# Patient Record
Sex: MALE | Race: WHITE | NOT HISPANIC OR LATINO | Employment: OTHER | URBAN - METROPOLITAN AREA
[De-identification: names, ages, dates, MRNs, and addresses within clinical notes are randomized per-mention and may not be internally consistent; named-entity substitution may affect disease eponyms.]

---

## 2017-01-25 ENCOUNTER — TELEPHONE (OUTPATIENT)
Dept: CARDIOLOGY | Facility: MEDICAL CENTER | Age: 72
End: 2017-01-25

## 2017-01-25 DIAGNOSIS — E78.5 HYPERLIPIDEMIA, UNSPECIFIED HYPERLIPIDEMIA TYPE: ICD-10-CM

## 2017-01-25 DIAGNOSIS — I10 ESSENTIAL HYPERTENSION: ICD-10-CM

## 2017-01-25 NOTE — TELEPHONE ENCOUNTER
----- Message from Karmen Crandall, Med Ass't sent at 1/25/2017  9:26 AM PST -----  Regarding: Lab Order Request   Contact: [phone]  Patient of [place] would like lab order sent to mailing address on file prior to appointment on 4/17/17.    Thank you

## 2017-04-06 LAB
ALBUMIN SERPL-MCNC: 4.5 G/DL (ref 3.5–4.8)
ALBUMIN/GLOB SERPL: 1.7 {RATIO} (ref 1.2–2.2)
ALP SERPL-CCNC: 68 IU/L (ref 39–117)
ALT SERPL-CCNC: 11 IU/L (ref 0–44)
AMBIG ABBREV CMP14 DFLT   977206: NORMAL
AMBIG ABBREV LP  977212: NORMAL
AST SERPL-CCNC: 13 IU/L (ref 0–40)
BILIRUB SERPL-MCNC: 0.7 MG/DL (ref 0–1.2)
BUN SERPL-MCNC: 21 MG/DL (ref 8–27)
BUN/CREAT SERPL: 24 (ref 10–24)
CALCIUM SERPL-MCNC: 9.7 MG/DL (ref 8.6–10.2)
CHLORIDE SERPL-SCNC: 100 MMOL/L (ref 96–106)
CHOLEST SERPL-MCNC: 208 MG/DL (ref 100–199)
CO2 SERPL-SCNC: 24 MMOL/L (ref 18–29)
COMMENT 011824: ABNORMAL
CREAT SERPL-MCNC: 0.89 MG/DL (ref 0.76–1.27)
GLOBULIN SER CALC-MCNC: 2.6 G/DL (ref 1.5–4.5)
GLUCOSE SERPL-MCNC: 92 MG/DL (ref 65–99)
HDLC SERPL-MCNC: 52 MG/DL
LDLC SERPL CALC-MCNC: 129 MG/DL (ref 0–99)
POTASSIUM SERPL-SCNC: 4.2 MMOL/L (ref 3.5–5.2)
PROT SERPL-MCNC: 7.1 G/DL (ref 6–8.5)
SODIUM SERPL-SCNC: 140 MMOL/L (ref 134–144)
TRIGL SERPL-MCNC: 135 MG/DL (ref 0–149)
VLDLC SERPL CALC-MCNC: 27 MG/DL (ref 5–40)

## 2017-04-17 ENCOUNTER — OFFICE VISIT (OUTPATIENT)
Dept: CARDIOLOGY | Facility: PHYSICIAN GROUP | Age: 72
End: 2017-04-17
Payer: MEDICARE

## 2017-04-17 VITALS
DIASTOLIC BLOOD PRESSURE: 76 MMHG | BODY MASS INDEX: 23.1 KG/M2 | HEIGHT: 71 IN | OXYGEN SATURATION: 93 % | WEIGHT: 165 LBS | HEART RATE: 76 BPM | SYSTOLIC BLOOD PRESSURE: 102 MMHG

## 2017-04-17 DIAGNOSIS — I49.3 PVC (PREMATURE VENTRICULAR CONTRACTION): ICD-10-CM

## 2017-04-17 DIAGNOSIS — E78.2 MIXED HYPERLIPIDEMIA: ICD-10-CM

## 2017-04-17 PROCEDURE — 4040F PNEUMOC VAC/ADMIN/RCVD: CPT | Mod: 8P | Performed by: INTERNAL MEDICINE

## 2017-04-17 PROCEDURE — 1036F TOBACCO NON-USER: CPT | Performed by: INTERNAL MEDICINE

## 2017-04-17 PROCEDURE — 3017F COLORECTAL CA SCREEN DOC REV: CPT | Mod: 8P | Performed by: INTERNAL MEDICINE

## 2017-04-17 PROCEDURE — 99213 OFFICE O/P EST LOW 20 MIN: CPT | Performed by: INTERNAL MEDICINE

## 2017-04-17 PROCEDURE — 1101F PT FALLS ASSESS-DOCD LE1/YR: CPT | Mod: 8P | Performed by: INTERNAL MEDICINE

## 2017-04-17 PROCEDURE — G8598 ASA/ANTIPLAT THER USED: HCPCS | Performed by: INTERNAL MEDICINE

## 2017-04-17 PROCEDURE — G8432 DEP SCR NOT DOC, RNG: HCPCS | Performed by: INTERNAL MEDICINE

## 2017-04-17 PROCEDURE — G8420 CALC BMI NORM PARAMETERS: HCPCS | Performed by: INTERNAL MEDICINE

## 2017-04-17 ASSESSMENT — ENCOUNTER SYMPTOMS
CARDIOVASCULAR NEGATIVE: 1
DIZZINESS: 1

## 2017-04-17 NOTE — PROGRESS NOTES
Subjective:   Avery Monroe is a 72 -year-old man with a history of dyslipidemia and erectile dysfunction as well as previously symptomatic PVCs.    He is doing very well, and has no cardiovascular complaints. He is mildly concerned about elevations in his diastolic blood pressure with 2 values of several just above 90 mmHg on the blood pressure log which she brings in to review with me today. Systolic blood pressures range between 90 and 120 mmHg.    Since the time of our last visit he had a hemicolectomy for a colon mass that turned out to be benign. Unfortunately, the initial re-anastomosis did not take, and he required a repeat side to side procedure. He had some diarrhea immediately following that related to which she had to adjust the formulation of his magnesium from magnesium oxide to citrate to ultimately magnesium taurine, which she is on now without any loose bowel movements.    Past Medical History   Diagnosis Date   • Hyperlipidemia    • Hypertension    • Mitral regurgitation    • PVC (premature ventricular contraction)    • Prostate cancer (CMS-HCC)    • Rheumatic fever      History reviewed. No pertinent past surgical history.  Family History   Problem Relation Age of Onset   • Heart Attack Mother    • Heart Attack Father      History   Smoking status   • Never Smoker    Smokeless tobacco   • Never Used     Allergies   Allergen Reactions   • Statins [Hmg-Coa-R Inhibitors]    • Sulfa Drugs      Outpatient Encounter Prescriptions as of 4/17/2017   Medication Sig Dispense Refill   • CREATINE PO Take  by mouth.     • magnesium gluconate (MAG-G) 500 MG tablet Take 500 mg by mouth every day.     • Calcium Carbonate-Vitamin D (CALCIUM PLUS VITAMIN D PO) Take  by mouth.     • B Complex Vitamins (B COMPLEX 1 PO) Take  by mouth every day.     • aspirin EC (ECOTRIN) 81 MG TBEC Take 81 mg by mouth every day.     • sildenafil citrate (VIAGRA) 100 MG tablet Take 100 mg by mouth as needed.       No  "facility-administered encounter medications on file as of 4/17/2017.     Review of Systems   Cardiovascular: Negative.    Neurological: Positive for dizziness (orthostasis after workouts).   All other systems reviewed and are negative.       Objective:   /76 mmHg  Pulse 76  Ht 1.803 m (5' 10.98\")  Wt 74.844 kg (165 lb)  BMI 23.02 kg/m2  SpO2 93%    Physical Exam   Constitutional: He is oriented to person, place, and time. He appears well-developed and well-nourished. No distress.   High-energy, pleasant elderly man in no distress   HENT:   Head: Normocephalic and atraumatic.   Eyes: Conjunctivae and EOM are normal. Pupils are equal, round, and reactive to light. No scleral icterus.   Neck: Neck supple. No JVD present. No tracheal deviation present.   Cardiovascular: Normal rate, regular rhythm, normal heart sounds and intact distal pulses.  Exam reveals no gallop and no friction rub.    No murmur heard.  Pulses:       Dorsalis pedis pulses are 2+ on the right side, and 2+ on the left side.   No carotid bruits   Pulmonary/Chest: Effort normal and breath sounds normal. No stridor. No respiratory distress. He has no wheezes. He has no rales.   Abdominal: Soft. Bowel sounds are normal. He exhibits no distension.   Musculoskeletal: He exhibits no edema.   Neurological: He is alert and oriented to person, place, and time.   Skin: Skin is warm and dry. No rash noted. He is not diaphoretic. No erythema. No pallor.   Psychiatric: He has a normal mood and affect. Judgment and thought content normal.   Vitals reviewed.       4/6/2015 10:42 3/21/2016 08:22 4/5/2017 08:30   Cholesterol,Tot 225 (H) 214 (H) 208 (H)   Triglycerides 112 81 135   HDL 56 67 52    (H) 131 (H) 129 (H)   VLDL Cholesterol Calc 22 16 27       Assessment:     1. PVC (premature ventricular contraction)     2. Mixed hyperlipidemia         Medical Decision Making:  Today's Assessment / Status / Plan:     Medical Decision Making:    He is " doing very well today from a cardiovascular perspective with much improved symptoms from the standpoint of his PVCs on magnesium. I reviewed again with him his lipid panel and discussed again a coronary CT scan, which she continues to defer. He was a bit concerned about diastolic blood pressures just barely above 90 mmHg. In our office today his blood pressure was 102/76 mmHg, and last time was 98/76 mmHg. I certainly would not recommend an antihypertensive for him presently. I reviewed again his previous exercise stress echocardiogram that showed no ischemic defect.    Rufus Conrad MD  Cardiologist, [facility] [place]

## 2017-04-17 NOTE — Clinical Note
Name:          Avery Monroe   YOB: 1945  Date:     4/17/2017      Damien Dinh M.D.  [address] [facility] [place] [address]     Rufus Conrad MD  [address] [place] [address]  Phone: [phone]  Back Line: [phone]  Fax: [phone]  E-mail: [email or web address]   Dear Dr. Dinh,    We had the pleasure of seeing your patient, Avery Monroe, in Cardiology Clinic at [facility] today.    As you know, he is a 72-year-old man with a history of dyslipidemia and erectile dysfunction as well as previously symptomatic PVCs.    He is doing very well today from a cardiovascular perspective with much improved symptoms from the standpoint of his PVCs on magnesium. I reviewed again with him his lipid panel and discussed again a coronary CT scan, which she continues to defer. He was a bit concerned about diastolic blood pressures just barely above 90 mmHg. In our office today his blood pressure was 102/76 mmHg, and last time was 98/76 mmHg. I certainly would not recommend an antihypertensive for him presently. I reviewed again his previous exercise stress echocardiogram that showed no ischemic defect.    We will have the patient follow-up in one year, sooner if needed.    Thank you for the referral and please do not hesitate to contact me at any time. My contact information is listed above.    This note was dictated using Dragon speech recognition software.     A full note including my physical examination and a full list of rectified medications is available in our medical record, and can be faxed as well.    Rufus Conrad MD  Cardiologist  [facility]

## 2017-04-17 NOTE — MR AVS SNAPSHOT
"        Avery Monroe   2017 3:00 PM   Office Visit   MRN: [id number]    Department:  [facility]   Dept Phone:  [phone]    Description:  Male : 1945   Provider:  Rufus Conrad M.D.           Reason for Visit     Follow-Up           Allergies as of 2017     Allergen Noted Reactions    Statins [Hmg-Coa-R Inhibitors] 2012       Sulfa Drugs 2012         Vital Signs     Blood Pressure Pulse Height Weight Body Mass Index Oxygen Saturation    102/76 mmHg 76 1.803 m (5' 10.98\") 74.844 kg (165 lb) 23.02 kg/m2 93%    Smoking Status                   Never Smoker            Basic Information     Date Of Birth Sex Race Ethnicity Preferred Language    1945 Male White Non- English      Problem List              ICD-10-CM Priority Class Noted - Resolved    Hyperlipidemia E78.5 High  Unknown - Present    Hypertension I10 High  Unknown - Present    PVC (premature ventricular contraction) I49.3 High  Unknown - Present    Carotid stenosis I65.29 High  2012 - Present      Health Maintenance        Date Due Completion Dates    IMM DTaP/Tdap/Td Vaccine (1 - Tdap) 3/7/1964 ---    COLONOSCOPY 3/7/1995 ---    IMM ZOSTER VACCINE 3/7/2005 ---    IMM PNEUMOCOCCAL 65+ (ADULT) LOW/MEDIUM RISK SERIES (1 of 2 - PCV13) 3/7/2010 ---            Current Immunizations     No immunizations on file.      Below and/or attached are the medications your provider expects you to take. Review all of your home medications and newly ordered medications with your provider and/or pharmacist. Follow medication instructions as directed by your provider and/or pharmacist. Please keep your medication list with you and share with your provider. Update the information when medications are discontinued, doses are changed, or new medications (including over-the-counter products) are added; and carry medication information at all times in the event of emergency situations     Allergies:  STATINS - (reactions " not documented)     SULFA DRUGS - (reactions not documented)               Medications  Valid as of: April 17, 2017 -  3:11 PM    Generic Name Brand Name Tablet Size Instructions for use    Aspirin (Tablet Delayed Response) ECOTRIN 81 MG Take 81 mg by mouth every day.        B Complex Vitamins   Take  by mouth every day.        Calcium Carbonate-Vitamin D   Take  by mouth.        Creatine   Take  by mouth.        Magnesium Gluconate (Tab) MAG-G 500 MG Take 500 mg by mouth every day.        Sildenafil Citrate (Tab) VIAGRA 100 MG Take 100 mg by mouth as needed.        .                 Medicines prescribed today were sent to:     [facility] [facility] [place] [place] - [address]    [address] [place] [address]    Phone: [phone] Fax: [phone]    Open 24 Hours?: No      Medication refill instructions:       If your prescription bottle indicates you have medication refills left, it is not necessary to call your provider’s office. Please contact your pharmacy and they will refill your medication.    If your prescription bottle indicates you do not have any refills left, you may request refills at any time through one of the following ways: The online [employer] system (except Urgent Care), by calling your provider’s office, or by asking your pharmacy to contact your provider’s office with a refill request. Medication refills are processed only during regular business hours and may not be available until the next business day. Your provider may request additional information or to have a follow-up visit with you prior to refilling your medication.   *Please Note: Medication refills are assigned a new Rx number when refilled electronically. Your pharmacy may indicate that no refills were authorized even though a new prescription for the same medication is available at the pharmacy. Please request the medicine by name with the pharmacy before contacting your provider for a refill.               [employer] Access Code: Activation code not generated  Current [employer] Status: Active

## 2017-04-17 NOTE — PROGRESS NOTES
Name:          Avery Monroe   YOB: 1945  Date:     4/17/2017      Damien Dinh M.D.  [address] [facility] [place] [address]     Rufus Conrad MD  [address] [place] [address]  Phone: [phone]  Back Line: [phone]  Fax: [phone]  E-mail: [email or web address]   Dear Dr. Dinh,    We had the pleasure of seeing your patient, Avery Monroe, in Cardiology Clinic at [facility] today.    As you know, he is a 72-year-old man with a history of dyslipidemia and erectile dysfunction as well as previously symptomatic PVCs.    He is doing very well today from a cardiovascular perspective with much improved symptoms from the standpoint of his PVCs on magnesium. I reviewed again with him his lipid panel and discussed again a coronary CT scan, which she continues to defer. He was a bit concerned about diastolic blood pressures just barely above 90 mmHg. In our office today his blood pressure was 102/76 mmHg, and last time was 98/76 mmHg. I certainly would not recommend an antihypertensive for him presently. I reviewed again his previous exercise stress echocardiogram that showed no ischemic defect.    We will have the patient follow-up in one year, sooner if needed.    Thank you for the referral and please do not hesitate to contact me at any time. My contact information is listed above.    This note was dictated using Dragon speech recognition software.     A full note including my physical examination and a full list of rectified medications is available in our medical record, and can be faxed as well.    Rufus Conrad MD  Cardiologist  [facility]

## 2018-04-25 ENCOUNTER — TELEPHONE (OUTPATIENT)
Dept: CARDIOLOGY | Facility: MEDICAL CENTER | Age: 73
End: 2018-04-25

## 2018-04-25 DIAGNOSIS — E78.5 HYPERLIPIDEMIA, UNSPECIFIED HYPERLIPIDEMIA TYPE: ICD-10-CM

## 2018-04-25 DIAGNOSIS — I10 ESSENTIAL HYPERTENSION: ICD-10-CM

## 2018-04-25 NOTE — TELEPHONE ENCOUNTER
----- Message from Cinthya Huitron, Med Ass't sent at 4/25/2018 10:20 AM PDT -----  Regarding: lab order  Please put in an order for BMP ans Lipid. Has an upcoming appointment.    Thank you

## 2018-04-27 ENCOUNTER — HOSPITAL ENCOUNTER (OUTPATIENT)
Dept: LAB | Facility: MEDICAL CENTER | Age: 73
End: 2018-04-27
Attending: UROLOGY
Payer: MEDICARE

## 2018-04-27 LAB — PSA SERPL-MCNC: <0.01 NG/ML (ref 0–4)

## 2018-04-27 PROCEDURE — 84153 ASSAY OF PSA TOTAL: CPT

## 2018-04-27 PROCEDURE — 36415 COLL VENOUS BLD VENIPUNCTURE: CPT

## 2018-05-02 ENCOUNTER — OFFICE VISIT (OUTPATIENT)
Dept: CARDIOLOGY | Facility: PHYSICIAN GROUP | Age: 73
End: 2018-05-02
Payer: MEDICARE

## 2018-05-02 VITALS
HEIGHT: 71 IN | OXYGEN SATURATION: 95 % | WEIGHT: 165 LBS | HEART RATE: 68 BPM | SYSTOLIC BLOOD PRESSURE: 112 MMHG | DIASTOLIC BLOOD PRESSURE: 80 MMHG | BODY MASS INDEX: 23.1 KG/M2

## 2018-05-02 DIAGNOSIS — I49.3 PVC (PREMATURE VENTRICULAR CONTRACTION): ICD-10-CM

## 2018-05-02 DIAGNOSIS — E78.5 DYSLIPIDEMIA: ICD-10-CM

## 2018-05-02 DIAGNOSIS — I10 ESSENTIAL HYPERTENSION: ICD-10-CM

## 2018-05-02 PROCEDURE — 99213 OFFICE O/P EST LOW 20 MIN: CPT | Performed by: INTERNAL MEDICINE

## 2018-05-02 ASSESSMENT — ENCOUNTER SYMPTOMS
CARDIOVASCULAR NEGATIVE: 1
DIZZINESS: 1

## 2018-05-02 NOTE — LETTER
Name:          Avery Monroe   YOB: 1945  Date:     05/02/2018      Damien Dinh M.D.  [address] [facility] [place] [address]     Rufus Conrad MD  [address] [place] [address]  Phone: [phone]  Back Line: [phone]  Fax: [phone]  E-mail: [email or web address]   Dear Dr. Dinh,    We had the pleasure of seeing your patient, Avery Monroe, in Cardiology Clinic at [facility] V[facility] today.    As you know, he is a 73-year-old man with a history of dyslipidemia and erectile dysfunction as well as previously symptomatic PVCs.    He is doing well today, and has no cardiovascular complaints. I reviewed his recent lipid panel with an LDL of 129 mg/dL. Given his rigorous physical activity, and paucity of other risk factors I am a bit reticent to initiate a statin. As an alternative, I have ordered a CT coronary calcium scan. Otherwise, his blood pressure is well controlled with diet and exercise. I have made no changes to his medical regimen.    Return in about 1 year (around 5/2/2019).    Thank you for the referral and please do not hesitate to contact me at any time. My contact information is listed above.    This note was dictated using Dragon speech recognition software.     A full note including my physical examination and a full list of rectified medications is available in our medical record, and can be faxed as well.    Rufus Conrad MD  Cardiologist  [facility]

## 2018-05-02 NOTE — PROGRESS NOTES
Subjective:   Avery Monroe is a 72 -year-old man with a history of dyslipidemia and erectile dysfunction as well as previously symptomatic PVCs.    He continues to do quite well with no complaints of palpitations consistent with his PVCs. He exercises very regularly, and brings in a log of his blood pressures which often times are in the 100s-110s mmHg systolic. He is concerned about his upcoming summer competition season including runs and swims, which can be fairly rigorous. In the past, he had routine stress testing to rule out obstructive coronary disease most recently with an exercise stress echocardiogram in 2014. All of those tests were negative for ischemia.    He does tell me about some episodes of tremulousness that he resolved with substituting his magnesium supplementation and going back up to 500 mg of elemental magnesium.    Past Medical History:   Diagnosis Date   • Carotid stenosis    • Hyperlipidemia    • Hypertension    • Mitral regurgitation    • Prostate cancer (HCC)    • PVC (premature ventricular contraction)    • Rheumatic fever      History reviewed. No pertinent surgical history.  Family History   Problem Relation Age of Onset   • Heart Attack Mother    • Heart Attack Father      History   Smoking Status   • Never Smoker   Smokeless Tobacco   • Never Used     Allergies   Allergen Reactions   • Statins [Hmg-Coa-R Inhibitors]    • Sulfa Drugs      Outpatient Encounter Prescriptions as of 5/2/2018   Medication Sig Dispense Refill   • CREATINE PO Take  by mouth.     • magnesium gluconate (MAG-G) 500 MG tablet Take 500 mg by mouth every day.     • Calcium Carbonate-Vitamin D (CALCIUM PLUS VITAMIN D PO) Take  by mouth.     • B Complex Vitamins (B COMPLEX 1 PO) Take  by mouth every day.     • aspirin EC (ECOTRIN) 81 MG TBEC Take 81 mg by mouth every day.     • sildenafil citrate (VIAGRA) 100 MG tablet Take 100 mg by mouth as needed.       No facility-administered encounter medications on  "file as of 5/2/2018.      Review of Systems   Cardiovascular: Negative.    Neurological: Positive for dizziness (orthostasis after workouts).   All other systems reviewed and are negative.       Objective:   /80   Pulse 68   Ht 1.803 m (5' 11\")   Wt 74.8 kg (165 lb)   SpO2 95%   BMI 23.01 kg/m²     Physical Exam   Constitutional: He is oriented to person, place, and time. He appears well-developed and well-nourished. No distress.   High-energy, pleasant elderly man in no distress   HENT:   Head: Normocephalic and atraumatic.   Eyes: Conjunctivae and EOM are normal. Pupils are equal, round, and reactive to light. No scleral icterus.   Neck: Neck supple. No JVD present. No tracheal deviation present.   Cardiovascular: Normal rate, regular rhythm, normal heart sounds and intact distal pulses.  Exam reveals no gallop and no friction rub.    No murmur heard.  Pulses:       Dorsalis pedis pulses are 2+ on the right side, and 2+ on the left side.   No carotid bruits   Pulmonary/Chest: Effort normal and breath sounds normal. No stridor. No respiratory distress. He has no wheezes. He has no rales.   Abdominal: Soft. Bowel sounds are normal. He exhibits no distension.   Musculoskeletal: He exhibits no edema.   Neurological: He is alert and oriented to person, place, and time.   Skin: Skin is warm and dry. No rash noted. He is not diaphoretic. No erythema. No pallor.   Psychiatric: He has a normal mood and affect. Judgment and thought content normal.   Vitals reviewed.    No results found for: WBC, RBC, HEMOGLOBIN, HEMATOCRIT, MCV, MCH, MCHC, MPV     Lab Results   Component Value Date/Time    SODIUM 140 04/05/2017 08:30 AM    SODIUM 141 04/05/2013 06:40 AM    SODIUM 141 04/05/2013 06:40 AM    POTASSIUM 4.2 04/05/2017 08:30 AM    POTASSIUM 4.1 04/05/2013 06:40 AM    POTASSIUM 4.1 04/05/2013 06:40 AM    CHLORIDE 100 04/05/2017 08:30 AM    CHLORIDE 105 04/05/2013 06:40 AM    CHLORIDE 105 04/05/2013 06:40 AM    CO2 24 " 04/05/2017 08:30 AM    CO2 24 04/05/2013 06:40 AM    CO2 24 04/05/2013 06:40 AM    GLUCOSE 92 04/05/2017 08:30 AM    GLUCOSE 94 04/05/2013 06:40 AM    GLUCOSE 94 04/05/2013 06:40 AM    BUN 21 04/05/2017 08:30 AM    BUN 24 04/05/2013 06:40 AM    BUN 24 04/05/2013 06:40 AM    CREATININE 0.89 04/05/2017 08:30 AM    CREATININE 0.85 04/05/2013 06:40 AM    CREATININE 0.85 04/05/2013 06:40 AM    BUNCREATRAT 24 04/05/2017 08:30 AM    BUNCREATRAT 28 (H) 04/05/2013 06:40 AM    BUNCREATRAT 28 (H) 04/05/2013 06:40 AM        Lab Results   Component Value Date/Time    ASTSGOT 13 04/05/2017 08:30 AM    ASTSGOT 20 04/05/2013 06:40 AM    ASTSGOT 20 04/05/2013 06:40 AM    ALTSGPT 11 04/05/2017 08:30 AM    ALTSGPT 16 04/05/2013 06:40 AM    ALTSGPT 16 04/05/2013 06:40 AM        Lab Results   Component Value Date/Time    CHOLSTRLTOT 208 (H) 04/05/2017 08:30 AM    CHOLSTRLTOT 179 04/05/2013 06:40 AM    CHOLSTRLTOT 179 04/05/2013 06:40 AM     (H) 04/05/2017 08:30 AM     (H) 04/05/2013 06:40 AM     (H) 04/05/2013 06:40 AM    HDL 52 04/05/2017 08:30 AM    HDL 54 04/05/2013 06:40 AM    HDL 54 04/05/2013 06:40 AM    TRIGLYCERIDE 135 04/05/2017 08:30 AM    TRIGLYCERIDE 92 04/05/2013 06:40 AM    TRIGLYCERIDE 92 04/05/2013 06:40 AM         No results found for this or any previous visit.    Exercise stress echocardiogram, 5/7/2014: He achieved 7 metabolic equivalents and 99% of age-predicted maximum heart rate with no ischemic ECG nor echocardiographic findings (negative for ischemia)    Assessment:     1. Dyslipidemia  CT-CARDIAC SCORING   2. Essential hypertension  EKG   3. PVC (premature ventricular contraction)         Medical Decision Making:  Today's Assessment / Status / Plan:     He is doing well today, and has no cardiovascular complaints. I reviewed his recent lipid panel with an LDL of 129 mg/dL. Given his rigorous physical activity, and paucity of other risk factors I am a bit reticent to initiate a statin. As an  alternative, I have ordered a CT coronary calcium scan. Otherwise, his blood pressure is well controlled with diet and exercise. I have made no changes to his medical regimen.    Rufus Conrad MD  Cardiologist, [facility] [place]     Return in about 1 year (around 5/2/2019).      Physical Exam   Constitutional: He is oriented to person, place, and time. He appears well-developed and well-nourished. No distress.   High-energy, pleasant elderly man in no distress   HENT:   Head: Normocephalic and atraumatic.   Eyes: Conjunctivae and EOM are normal. Pupils are equal, round, and reactive to light. No scleral icterus.   Neck: Neck supple. No JVD present. No tracheal deviation present.   Cardiovascular: Normal rate, regular rhythm, normal heart sounds and intact distal pulses.  Exam reveals no gallop and no friction rub.    No murmur heard.  Pulses:       Dorsalis pedis pulses are 2+ on the right side, and 2+ on the left side.   No carotid bruits   Pulmonary/Chest: Effort normal and breath sounds normal. No stridor. No respiratory distress. He has no wheezes. He has no rales.   Abdominal: Soft. Bowel sounds are normal. He exhibits no distension.   Musculoskeletal: He exhibits no edema.   Neurological: He is alert and oriented to person, place, and time.   Skin: Skin is warm and dry. No rash noted. He is not diaphoretic. No erythema. No pallor.   Psychiatric: He has a normal mood and affect. Judgment and thought content normal.   Vitals reviewed.

## 2018-05-03 ENCOUNTER — HOSPITAL ENCOUNTER (OUTPATIENT)
Dept: LAB | Facility: MEDICAL CENTER | Age: 73
End: 2018-05-03
Attending: INTERNAL MEDICINE
Payer: MEDICARE

## 2018-05-03 DIAGNOSIS — E78.5 HYPERLIPIDEMIA, UNSPECIFIED HYPERLIPIDEMIA TYPE: ICD-10-CM

## 2018-05-03 DIAGNOSIS — I10 ESSENTIAL HYPERTENSION: ICD-10-CM

## 2018-05-03 LAB
ALBUMIN SERPL BCP-MCNC: 3.9 G/DL (ref 3.2–4.9)
ALBUMIN/GLOB SERPL: 1.4 G/DL
ALP SERPL-CCNC: 46 U/L (ref 30–99)
ALT SERPL-CCNC: 12 U/L (ref 2–50)
ANION GAP SERPL CALC-SCNC: 9 MMOL/L (ref 0–11.9)
AST SERPL-CCNC: 14 U/L (ref 12–45)
BILIRUB SERPL-MCNC: 0.5 MG/DL (ref 0.1–1.5)
BUN SERPL-MCNC: 17 MG/DL (ref 8–22)
CALCIUM SERPL-MCNC: 9.3 MG/DL (ref 8.5–10.5)
CHLORIDE SERPL-SCNC: 104 MMOL/L (ref 96–112)
CHOLEST SERPL-MCNC: 187 MG/DL (ref 100–199)
CO2 SERPL-SCNC: 25 MMOL/L (ref 20–33)
CREAT SERPL-MCNC: 0.93 MG/DL (ref 0.5–1.4)
GLOBULIN SER CALC-MCNC: 2.8 G/DL (ref 1.9–3.5)
GLUCOSE SERPL-MCNC: 78 MG/DL (ref 65–99)
HDLC SERPL-MCNC: 53 MG/DL
LDLC SERPL CALC-MCNC: 114 MG/DL
POTASSIUM SERPL-SCNC: 4.2 MMOL/L (ref 3.6–5.5)
PROT SERPL-MCNC: 6.7 G/DL (ref 6–8.2)
SODIUM SERPL-SCNC: 138 MMOL/L (ref 135–145)
TRIGL SERPL-MCNC: 100 MG/DL (ref 0–149)

## 2018-05-03 PROCEDURE — 80053 COMPREHEN METABOLIC PANEL: CPT

## 2018-05-03 PROCEDURE — 36415 COLL VENOUS BLD VENIPUNCTURE: CPT

## 2018-05-03 PROCEDURE — 80061 LIPID PANEL: CPT

## 2018-05-07 ENCOUNTER — HOSPITAL ENCOUNTER (OUTPATIENT)
Dept: RADIOLOGY | Facility: MEDICAL CENTER | Age: 73
End: 2018-05-07
Attending: INTERNAL MEDICINE
Payer: COMMERCIAL

## 2018-05-07 DIAGNOSIS — E78.5 DYSLIPIDEMIA: ICD-10-CM

## 2018-05-07 PROCEDURE — 4410556 CT-CARDIAC SCORING

## 2018-05-08 ENCOUNTER — TELEPHONE (OUTPATIENT)
Dept: CARDIOLOGY | Facility: MEDICAL CENTER | Age: 73
End: 2018-05-08

## 2018-05-08 NOTE — TELEPHONE ENCOUNTER
----- Message from Rufus Conrad M.D. sent at 5/8/2018  8:10 AM PDT -----  Normal renal function, and electrolytes. Cholesterol is within the range of previous variation, and I will await the formal read on the CT coronary calcium scan to define my treatment target.    JUANITO HOFFMAN

## 2018-05-16 ENCOUNTER — TELEPHONE (OUTPATIENT)
Dept: CARDIOLOGY | Facility: MEDICAL CENTER | Age: 73
End: 2018-05-16

## 2018-05-16 DIAGNOSIS — E78.2 MIXED HYPERLIPIDEMIA: ICD-10-CM

## 2018-05-16 RX ORDER — ATORVASTATIN CALCIUM 10 MG/1
10 TABLET, FILM COATED ORAL EVERY EVENING
Qty: 30 TAB | Refills: 3 | Status: SHIPPED | OUTPATIENT
Start: 2018-05-16 | End: 2018-08-14 | Stop reason: SDUPTHER

## 2018-05-16 NOTE — TELEPHONE ENCOUNTER
Pt called back, discussed CT Cardiac scoring and Dr Conrad recommendations, pt agreed and verbalizes understanding, he requested for a 30 day supply of Atorvastatin to be sent to [facility] for now, he would like to monitor it first as he had a reaction from Simvastatin before, instructed pt to call our office if develop any problems after taking new meds, pt appreciative and verbalizes understanding     New Rx for Atorvastatin 10mg PO nightly sent to [facility]

## 2018-05-16 NOTE — TELEPHONE ENCOUNTER
----- Message from Rufus Conrad M.D. sent at 5/15/2018  9:22 PM PDT -----  Moderate but not severe coronary calcifications are seen.  On the basis of this I would recommend atorvastatin 10 mg p.o. nightly to target LDL less than 100 mg/dL.    IBA MD    ==========================================================    Attempted to call pt, no answer, left vm to call back

## 2018-08-14 ENCOUNTER — TELEPHONE (OUTPATIENT)
Dept: CARDIOLOGY | Facility: MEDICAL CENTER | Age: 73
End: 2018-08-14

## 2018-08-14 DIAGNOSIS — E78.2 MIXED HYPERLIPIDEMIA: ICD-10-CM

## 2018-08-14 RX ORDER — ATORVASTATIN CALCIUM 10 MG/1
10 TABLET, FILM COATED ORAL EVERY EVENING
Qty: 90 TAB | Refills: 3 | Status: SHIPPED | OUTPATIENT
Start: 2018-08-14 | End: 2019-09-23 | Stop reason: SDUPTHER

## 2018-08-14 NOTE — TELEPHONE ENCOUNTER
Avery Conrad M.D. 47 minutes ago (1:05 PM)         I just refilled my Atorvastatin 10mg for the last allowed refill for 30 days.   I am able to tolerate the drug just fine up to now.   I need further refills.  Do I need an appointment?  Do I need to do a lab?   FWIW   It is [facility] here in [place].  Rx#[id number]   Thank You,   Avery Monroe         ================================================================================    Refill Rx for Atorvastatin sent to [facility] per pt request.     Pt notified through [employer]

## 2019-04-08 ENCOUNTER — TELEPHONE (OUTPATIENT)
Dept: CARDIOLOGY | Facility: MEDICAL CENTER | Age: 74
End: 2019-04-08

## 2019-04-08 DIAGNOSIS — I10 ESSENTIAL HYPERTENSION: ICD-10-CM

## 2019-04-08 DIAGNOSIS — E78.2 MIXED HYPERLIPIDEMIA: ICD-10-CM

## 2019-04-08 NOTE — TELEPHONE ENCOUNTER
Avery Conrad M.D. 3 days ago         Avery Monroe   1945   I have an appointment with Dr Conrad on May 15, 2019 in [place].   In the past, the office has mailed me requisitions for lab.   Usually a complete metabolic panel and lipids.   This is my first year on Lipitor, do I need further tests for liver, muscle, etc.?   [address] [facility], [place]  [address]   Thanks        CMP and Lipid profile ordered, lab slip mailed to pt    Pt notified through [employer]

## 2019-05-08 LAB
ALBUMIN SERPL-MCNC: 4.5 G/DL (ref 3.5–4.8)
ALBUMIN/GLOB SERPL: 1.8 {RATIO} (ref 1.2–2.2)
ALP SERPL-CCNC: 68 IU/L (ref 39–117)
ALT SERPL-CCNC: 17 IU/L (ref 0–44)
AST SERPL-CCNC: 22 IU/L (ref 0–40)
BILIRUB SERPL-MCNC: 1 MG/DL (ref 0–1.2)
BUN SERPL-MCNC: 15 MG/DL (ref 8–27)
BUN/CREAT SERPL: 16 (ref 10–24)
CALCIUM SERPL-MCNC: 9.5 MG/DL (ref 8.6–10.2)
CHLORIDE SERPL-SCNC: 104 MMOL/L (ref 96–106)
CHOLEST SERPL-MCNC: 160 MG/DL (ref 100–199)
CO2 SERPL-SCNC: 22 MMOL/L (ref 20–29)
CREAT SERPL-MCNC: 0.92 MG/DL (ref 0.76–1.27)
GLOBULIN SER CALC-MCNC: 2.5 G/DL (ref 1.5–4.5)
GLUCOSE SERPL-MCNC: 98 MG/DL (ref 65–99)
HDLC SERPL-MCNC: 58 MG/DL
LABORATORY COMMENT REPORT: NORMAL
LDLC SERPL CALC-MCNC: 86 MG/DL (ref 0–99)
POTASSIUM SERPL-SCNC: 4.3 MMOL/L (ref 3.5–5.2)
PROT SERPL-MCNC: 7 G/DL (ref 6–8.5)
SODIUM SERPL-SCNC: 142 MMOL/L (ref 134–144)
TRIGL SERPL-MCNC: 78 MG/DL (ref 0–149)
VLDLC SERPL CALC-MCNC: 16 MG/DL (ref 5–40)

## 2019-05-15 ENCOUNTER — OFFICE VISIT (OUTPATIENT)
Dept: CARDIOLOGY | Facility: PHYSICIAN GROUP | Age: 74
End: 2019-05-15
Payer: MEDICARE

## 2019-05-15 VITALS
WEIGHT: 159 LBS | OXYGEN SATURATION: 94 % | HEIGHT: 71 IN | HEART RATE: 84 BPM | DIASTOLIC BLOOD PRESSURE: 80 MMHG | BODY MASS INDEX: 22.26 KG/M2 | SYSTOLIC BLOOD PRESSURE: 118 MMHG

## 2019-05-15 DIAGNOSIS — R93.1 AGATSTON CAC SCORE 200-399: Primary | ICD-10-CM

## 2019-05-15 DIAGNOSIS — I10 ESSENTIAL HYPERTENSION: ICD-10-CM

## 2019-05-15 DIAGNOSIS — E78.5 DYSLIPIDEMIA: ICD-10-CM

## 2019-05-15 DIAGNOSIS — I65.29 STENOSIS OF CAROTID ARTERY, UNSPECIFIED LATERALITY: ICD-10-CM

## 2019-05-15 PROCEDURE — 99214 OFFICE O/P EST MOD 30 MIN: CPT | Performed by: INTERNAL MEDICINE

## 2019-05-15 RX ORDER — HYDROCODONE BITARTRATE AND ACETAMINOPHEN 5; 325 MG/1; MG/1
TABLET ORAL
Refills: 0 | COMMUNITY
Start: 2019-04-23 | End: 2019-05-15

## 2019-05-15 RX ORDER — CHLORHEXIDINE GLUCONATE ORAL RINSE 1.2 MG/ML
SOLUTION DENTAL
Refills: 0 | COMMUNITY
Start: 2019-04-23 | End: 2019-05-15

## 2019-05-15 RX ORDER — AMOXICILLIN 500 MG/1
CAPSULE ORAL
Refills: 0 | COMMUNITY
Start: 2019-04-23 | End: 2019-05-15

## 2019-05-15 ASSESSMENT — ENCOUNTER SYMPTOMS
DIZZINESS: 1
CARDIOVASCULAR NEGATIVE: 1

## 2019-05-15 NOTE — LETTER
Name:          Avery Monroe   YOB: 1945  Date:     05/15/2019      Damien Dinh M.D.  [address] [facility] [place] [address]     Rufus Conrad MD  [address] [place] [id number]  Phone: [phone]  Back Line: [phone]  Fax: [phone]  E-mail: [email or web address]   Dear Dr. Dinh,    We had the pleasure of seeing your patient, Avery Monroe, in Cardiology Clinic at [facility] today.    As you know, he is a 74-year-old man with a history of dyslipidemia, moderately calcified coronary arteries with a calcium score of 214 on 5/2018, and erectile dysfunction as well as previously symptomatic PVCs.    He is doing wonderfully with much improved control of his cholesterol with the addition of atorvastatin on top of a very active lifestyle and a generally heart healthy diet.  I emphasized the importance of the same given his calcium score of 214 this time last year.  Outside of that, I made no changes to his cardiovascular regimen nor ordered additional testing.    Return in about 1 year (around 5/15/2020).    Thank you for the referral and please do not hesitate to contact me at any time. My contact information is listed above.    This note was dictated using Dragon speech recognition software.     A full note including my physical examination and a full list of rectified medications is available in our medical record, and can be faxed as well.    Rufus Conrad MD  Cardiologist  [facility]

## 2019-09-23 DIAGNOSIS — E78.2 MIXED HYPERLIPIDEMIA: ICD-10-CM

## 2019-09-24 RX ORDER — ATORVASTATIN CALCIUM 10 MG/1
TABLET, FILM COATED ORAL
Qty: 90 TAB | Refills: 2 | Status: SHIPPED | OUTPATIENT
Start: 2019-09-24 | End: 2020-06-23

## 2019-11-06 ENCOUNTER — OFFICE VISIT (OUTPATIENT)
Dept: CARDIOLOGY | Facility: PHYSICIAN GROUP | Age: 74
End: 2019-11-06
Payer: MEDICARE

## 2019-11-06 VITALS
HEART RATE: 66 BPM | OXYGEN SATURATION: 97 % | WEIGHT: 156 LBS | DIASTOLIC BLOOD PRESSURE: 80 MMHG | SYSTOLIC BLOOD PRESSURE: 120 MMHG | HEIGHT: 70 IN | BODY MASS INDEX: 22.33 KG/M2

## 2019-11-06 DIAGNOSIS — I25.10 CORONARY ARTERY CALCIFICATION SEEN ON CT SCAN: ICD-10-CM

## 2019-11-06 DIAGNOSIS — R93.1 AGATSTON CAC SCORE 200-399: ICD-10-CM

## 2019-11-06 DIAGNOSIS — E78.00 PURE HYPERCHOLESTEROLEMIA: ICD-10-CM

## 2019-11-06 DIAGNOSIS — I10 ESSENTIAL HYPERTENSION: ICD-10-CM

## 2019-11-06 DIAGNOSIS — I65.23 CAROTID ARTERY PLAQUE, BILATERAL: ICD-10-CM

## 2019-11-06 DIAGNOSIS — I49.3 PVC (PREMATURE VENTRICULAR CONTRACTION): ICD-10-CM

## 2019-11-06 PROCEDURE — 99214 OFFICE O/P EST MOD 30 MIN: CPT | Performed by: INTERNAL MEDICINE

## 2019-11-06 NOTE — PROGRESS NOTES
"Subjective:   Chief Complaint:   Chief Complaint   Patient presents with   • Hyperlipidemia       Avery Monroe is a 74 y.o. male who returns today for calcified coronary artery disease, calcium score 214 in May 2018, symptomatic PVCs and dyslipidemia.    He recalls mild HTN and PVCs and HLP.  PVCs found incidentally, went away with exercise, started Mg and they went away.    Underwent calcium scoring , predominantly LAD, left main was clear.  Previous stress echo in  which was normal.    Has pure hyperlipidemia, on Lipitor 10, LDL 86, had been 129.  Severe foot cramping on simvastatin.    Father  of MI and CVA after kidney failure, in his 90s.  Mother with HTN, stroke, had valve surgery  Former smoker, age 12 to 21. Has AAA screening.  No history of diabetes.  No recent hypertension, no meds.  No history of autoimmune disease such as lupus or rheumatoid arthritis.  No chronic kidney disease.    Prior hemicolectomy for polyp that turned out to be benign.    He is not limited by chest pain, pressure or tightness with activity.   No significant dyspnea on exertion, orthopnea or lower extremity swelling.   No significant palpitations, dizziness, or presyncope/syncope.   Mild orthostasis after exercise, brief, passes quickly.  No symptoms of leg claudication.   No stroke/TIA like symptoms.    He exercising vigorously.  He is an open water swimmer and runner.  HR at 125 bpm for 30 min, then intervals,     Lives in [place].    DATA REVIEWED by me:  ECG 2018  Sinus, rate 57    Exercise stress echocardiogram, 2014: He achieved 7 metabolic equivalents and 99% of age-predicted maximum heart rate with no ischemic ECG nor echocardiographic findings (negative for ischemia)     Coronary calcium scan, 2018:  \"Coronary calcification:  LMA - 0.0  LCX - 33.2  LAD - 164.5  RCA - 16.6  Calcium score:  214.4\"    Carotid ultrasound 2012 mild plaque, no stenosis    Most recent labs:     2019 sodium 142, " potassium 4.3, creatinine 0.92, LFTs normal, total cholesterol 160, triglycerides 78, HDL 58, LDL 86    Past Medical History:   Diagnosis Date   • Carotid stenosis    • Hyperlipidemia    • Hypertension    • Mitral regurgitation    • Prostate cancer (HCC)    • PVC (premature ventricular contraction)    • Rheumatic fever      History reviewed. No pertinent surgical history.  Family History   Problem Relation Age of Onset   • Heart Disease Mother         Valve replacement, late 80s   • Stroke Mother    • Hypertension Mother    • Heart Attack Father    • Kidney Disease Father      Social History     Socioeconomic History   • Marital status: Unknown     Spouse name: Not on file   • Number of children: Not on file   • Years of education: Not on file   • Highest education level: Not on file   Occupational History   • Not on file   Social Needs   • Financial resource strain: Not on file   • Food insecurity:     Worry: Not on file     Inability: Not on file   • Transportation needs:     Medical: Not on file     Non-medical: Not on file   Tobacco Use   • Smoking status: Never Smoker   • Smokeless tobacco: Never Used   Substance and Sexual Activity   • Alcohol use: No   • Drug use: Not on file   • Sexual activity: Yes   Lifestyle   • Physical activity:     Days per week: Not on file     Minutes per session: Not on file   • Stress: Not on file   Relationships   • Social connections:     Talks on phone: Not on file     Gets together: Not on file     Attends Advent service: Not on file     Active member of club or organization: Not on file     Attends meetings of clubs or organizations: Not on file     Relationship status: Not on file   • Intimate partner violence:     Fear of current or ex partner: Not on file     Emotionally abused: Not on file     Physically abused: Not on file     Forced sexual activity: Not on file   Other Topics Concern   • Not on file   Social History Narrative   • Not on file     Allergies   Allergen  "Reactions   • Statins [Hmg-Coa-R Inhibitors]    • Sulfa Drugs        Current Outpatient Medications   Medication Sig Dispense Refill   • atorvastatin (LIPITOR) 10 MG Tab TAKE 1 TABLET BY MOUTH IN THE EVENING 90 Tab 2   • Non Formulary Request Tri-mix injection for ED   The active ingredients in the mixture are usually alprostadil, papaverine, and phentolamine.     • CREATINE PO Take  by mouth.     • magnesium gluconate (MAG-G) 500 MG tablet Take 500 mg by mouth every day.     • Calcium Carbonate-Vitamin D (CALCIUM PLUS VITAMIN D PO) Take  by mouth.     • B Complex Vitamins (B COMPLEX 1 PO) Take  by mouth every day.     • sildenafil citrate (VIAGRA) 100 MG tablet Take 100 mg by mouth as needed.       No current facility-administered medications for this visit.        ROS  All others systems reviewed and negative.     Objective:     /80 (BP Location: Right arm, Patient Position: Sitting)   Pulse 66   Ht 1.778 m (5' 10\")   Wt 70.8 kg (156 lb)   SpO2 97%  Body mass index is 22.38 kg/m².    Physical Exam   General: No acute distress. Well nourished.  HEENT: EOM grossly intact, no scleral icterus, no pharyngeal erythema.   Neck:  No JVD, no bruits, trachea midline  CVS: RRR. Normal S1, S2. No M/R/G. No LE edema.  2+ radial pulses, 2+ PT pulses  Resp: CTAB. No wheezing or crackles/rhonchi. Normal respiratory effort.  Abdomen: Soft, NT, no russ hepatomegaly.  MSK/Ext: No clubbing or cyanosis.  Skin: Warm and dry, no rashes.  Neurological: CN III-XII grossly intact. No focal deficits.   Psych: A&O x 3, appropriate affect, good judgement      Assessment:     1. Essential hypertension     2. Pure hypercholesterolemia     3. PVC (premature ventricular contraction)     4. Agatston CAC score 200-399     5. Coronary artery calcification seen on CT scan     6. Carotid artery plaque, bilateral         Medical Decision Making:  Today's Assessment / Status / Plan:     -Cont primary prevention statin  -Benefit of ASA not " known  -We reviewed CT scan together  -He will cont to exercise  -Discussed signs/sx of ACS  -Labs with PCP, annual CMP and lipids us or PCP  -Watch for arrythmia with cold water swimming    Return in about 1 year (around 11/6/2020).    It is my pleasure to participate in the care of Mr. Monroe.  Please do not hesitate to contact me with questions or concerns.    Maame Smallwood MD, [facility]  Cardiologist [facility]    Please note that this dictation was created using voice recognition software. I have made every reasonable attempt to correct obvious errors, but it is possible there are errors of grammar and possibly content that I did not discover before finalizing the note.

## 2019-11-06 NOTE — LETTER
[facility]   [address] [place] [address]  Phone: [phone]  Fax: [phone]              Avery Monroe  1945    Encounter Date: 2019    Maame Smallwood M.D.          PROGRESS NOTE:  Subjective:   Chief Complaint:   Chief Complaint   Patient presents with   • Hyperlipidemia       Avery Monroe is a 74 y.o. male who returns today for calcified coronary artery disease, calcium score 214 in May 2018, symptomatic PVCs and dyslipidemia.    He recalls mild HTN and PVCs and HLP.  PVCs found incidentally, went away with exercise, started Mg and they went away.    Underwent calcium scoring , predominantly LAD, left main was clear.  Previous stress echo in  which was normal.    Has pure hyperlipidemia, on Lipitor 10, LDL 86, had been 129.  Severe foot cramping on simvastatin.    Father  of MI and CVA after kidney failure, in his 90s.  Mother with HTN, stroke, had valve surgery  Former smoker, age 12 to 21. Has AAA screening.  No history of diabetes.  No recent hypertension, no meds.  No history of autoimmune disease such as lupus or rheumatoid arthritis.  No chronic kidney disease.    Prior hemicolectomy for polyp that turned out to be benign.    He is not limited by chest pain, pressure or tightness with activity.   No significant dyspnea on exertion, orthopnea or lower extremity swelling.   No significant palpitations, dizziness, or presyncope/syncope.   Mild orthostasis after exercise, brief, passes quickly.  No symptoms of leg claudication.   No stroke/TIA like symptoms.    He exercising vigorously.  He is an open water swimmer and runner.  HR at 125 bpm for 30 min, then intervals,     Lives in [place].    DATA REVIEWED by me:  ECG 2018  Sinus, rate 57    Exercise stress echocardiogram, 2014: He achieved 7 metabolic equivalents and 99% of age-predicted maximum heart rate with no ischemic ECG nor  "echocardiographic findings (negative for ischemia)     Coronary calcium scan, 5/8/2018:  \"Coronary calcification:  LMA - 0.0  LCX - 33.2  LAD - 164.5  RCA - 16.6  Calcium score:  214.4\"    Carotid ultrasound 2012 mild plaque, no stenosis    Most recent labs:     5/7/2019 sodium 142, potassium 4.3, creatinine 0.92, LFTs normal, total cholesterol 160, triglycerides 78, HDL 58, LDL 86    Past Medical History:   Diagnosis Date   • Carotid stenosis    • Hyperlipidemia    • Hypertension    • Mitral regurgitation    • Prostate cancer (HCC)    • PVC (premature ventricular contraction)    • Rheumatic fever      History reviewed. No pertinent surgical history.  Family History   Problem Relation Age of Onset   • Heart Disease Mother         Valve replacement, late 80s   • Stroke Mother    • Hypertension Mother    • Heart Attack Father    • Kidney Disease Father      Social History     Socioeconomic History   • Marital status: Unknown     Spouse name: Not on file   • Number of children: Not on file   • Years of education: Not on file   • Highest education level: Not on file   Occupational History   • Not on file   Social Needs   • Financial resource strain: Not on file   • Food insecurity:     Worry: Not on file     Inability: Not on file   • Transportation needs:     Medical: Not on file     Non-medical: Not on file   Tobacco Use   • Smoking status: Never Smoker   • Smokeless tobacco: Never Used   Substance and Sexual Activity   • Alcohol use: No   • Drug use: Not on file   • Sexual activity: Yes   Lifestyle   • Physical activity:     Days per week: Not on file     Minutes per session: Not on file   • Stress: Not on file   Relationships   • Social connections:     Talks on phone: Not on file     Gets together: Not on file     Attends [employer] service: Not on file     Active member of club or organization: Not on file     Attends meetings of clubs or organizations: Not on file     Relationship status: Not on file   • Intimate " "partner violence:     Fear of current or ex partner: Not on file     Emotionally abused: Not on file     Physically abused: Not on file     Forced sexual activity: Not on file   Other Topics Concern   • Not on file   Social History Narrative   • Not on file     Allergies   Allergen Reactions   • Statins [Hmg-Coa-R Inhibitors]    • Sulfa Drugs        Current Outpatient Medications   Medication Sig Dispense Refill   • atorvastatin (LIPITOR) 10 MG Tab TAKE 1 TABLET BY MOUTH IN THE EVENING 90 Tab 2   • Non Formulary Request Tri-mix injection for ED   The active ingredients in the mixture are usually alprostadil, papaverine, and phentolamine.     • CREATINE PO Take  by mouth.     • magnesium gluconate (MAG-G) 500 MG tablet Take 500 mg by mouth every day.     • Calcium Carbonate-Vitamin D (CALCIUM PLUS VITAMIN D PO) Take  by mouth.     • B Complex Vitamins (B COMPLEX 1 PO) Take  by mouth every day.     • sildenafil citrate (VIAGRA) 100 MG tablet Take 100 mg by mouth as needed.       No current facility-administered medications for this visit.        ROS  All others systems reviewed and negative.     Objective:     /80 (BP Location: Right arm, Patient Position: Sitting)   Pulse 66   Ht 1.778 m (5' 10\")   Wt 70.8 kg (156 lb)   SpO2 97%  Body mass index is 22.38 kg/m².    Physical Exam   General: No acute distress. Well nourished.  HEENT: EOM grossly intact, no scleral icterus, no pharyngeal erythema.   Neck:  No JVD, no bruits, trachea midline  CVS: RRR. Normal S1, S2. No M/R/G. No LE edema.  2+ radial pulses, 2+ PT pulses  Resp: CTAB. No wheezing or crackles/rhonchi. Normal respiratory effort.  Abdomen: Soft, NT, no russ hepatomegaly.  MSK/Ext: No clubbing or cyanosis.  Skin: Warm and dry, no rashes.  Neurological: CN III-XII grossly intact. No focal deficits.   Psych: A&O x 3, appropriate affect, good judgement      Assessment:     1. Essential hypertension     2. Pure hypercholesterolemia     3. PVC (premature " ventricular contraction)     4. Agatston CAC score 200-399     5. Coronary artery calcification seen on CT scan     6. Carotid artery plaque, bilateral         Medical Decision Making:  Today's Assessment / Status / Plan:     -Cont primary prevention statin  -Benefit of ASA not known  -We reviewed CT scan together  -He will cont to exercise  -Discussed signs/sx of ACS  -Labs with PCP, annual CMP and lipids us or PCP  -Watch for arrythmia with cold water swimming    Return in about 1 year (around 11/6/2020).    It is my pleasure to participate in the care of Mr. Monroe.  Please do not hesitate to contact me with questions or concerns.    Maame Smallwood MD, [facility]  Cardiologist [facility]    Please note that this dictation was created using voice recognition software. I have made every reasonable attempt to correct obvious errors, but it is possible there are errors of grammar and possibly content that I did not discover before finalizing the note.      Damien Dinh M.D.  [address] [place] [id number]  VIA Facsimile: [phone]

## 2020-12-30 ENCOUNTER — PATIENT MESSAGE (OUTPATIENT)
Dept: CARDIOLOGY | Facility: PHYSICIAN GROUP | Age: 75
End: 2020-12-30

## 2020-12-30 DIAGNOSIS — E78.2 MIXED HYPERLIPIDEMIA: ICD-10-CM

## 2020-12-30 RX ORDER — ATORVASTATIN CALCIUM 10 MG/1
TABLET, FILM COATED ORAL
Qty: 90 TAB | Refills: 0 | Status: SHIPPED | OUTPATIENT
Start: 2020-12-30 | End: 2021-04-03 | Stop reason: SDUPTHER

## 2021-02-24 ENCOUNTER — APPOINTMENT (OUTPATIENT)
Dept: CARDIOLOGY | Facility: PHYSICIAN GROUP | Age: 76
End: 2021-02-24
Payer: MEDICARE

## 2021-04-03 DIAGNOSIS — E78.2 MIXED HYPERLIPIDEMIA: ICD-10-CM

## 2021-04-05 RX ORDER — ATORVASTATIN CALCIUM 10 MG/1
TABLET, FILM COATED ORAL
Qty: 90 TABLET | Refills: 0 | Status: SHIPPED | OUTPATIENT
Start: 2021-04-05 | End: 2021-06-27 | Stop reason: SDUPTHER

## 2021-04-21 ENCOUNTER — OFFICE VISIT (OUTPATIENT)
Dept: CARDIOLOGY | Facility: PHYSICIAN GROUP | Age: 76
End: 2021-04-21
Payer: MEDICARE

## 2021-04-21 VITALS
DIASTOLIC BLOOD PRESSURE: 78 MMHG | BODY MASS INDEX: 22.81 KG/M2 | HEIGHT: 70 IN | WEIGHT: 159.3 LBS | HEART RATE: 68 BPM | OXYGEN SATURATION: 96 % | SYSTOLIC BLOOD PRESSURE: 118 MMHG

## 2021-04-21 DIAGNOSIS — I25.10 CORONARY ARTERY CALCIFICATION SEEN ON CT SCAN: ICD-10-CM

## 2021-04-21 DIAGNOSIS — I10 ESSENTIAL HYPERTENSION: ICD-10-CM

## 2021-04-21 DIAGNOSIS — E78.00 PURE HYPERCHOLESTEROLEMIA: ICD-10-CM

## 2021-04-21 DIAGNOSIS — E78.5 DYSLIPIDEMIA: ICD-10-CM

## 2021-04-21 DIAGNOSIS — I65.29 STENOSIS OF CAROTID ARTERY, UNSPECIFIED LATERALITY: ICD-10-CM

## 2021-04-21 DIAGNOSIS — I49.3 PVC (PREMATURE VENTRICULAR CONTRACTION): ICD-10-CM

## 2021-04-21 DIAGNOSIS — E78.2 MIXED HYPERLIPIDEMIA: ICD-10-CM

## 2021-04-21 DIAGNOSIS — I65.23 CAROTID ARTERY PLAQUE, BILATERAL: ICD-10-CM

## 2021-04-21 DIAGNOSIS — R93.1 AGATSTON CAC SCORE 200-399: ICD-10-CM

## 2021-04-21 PROCEDURE — 99214 OFFICE O/P EST MOD 30 MIN: CPT | Performed by: INTERNAL MEDICINE

## 2021-04-21 RX ORDER — AZELASTINE HYDROCHLORIDE 137 UG/1
1 SPRAY, METERED NASAL 2 TIMES DAILY
COMMUNITY
Start: 2021-03-10 | End: 2022-04-20

## 2021-04-21 RX ORDER — FLUTICASONE PROPIONATE 50 MCG
SPRAY, SUSPENSION (ML) NASAL
COMMUNITY
Start: 2021-02-05 | End: 2022-04-20

## 2021-04-21 NOTE — PROGRESS NOTES
Subjective:   Chief Complaint:   Chief Complaint   Patient presents with   • HTN (Controlled)   • Premature Ventricular Contractions (PVCs)   • Carotid Artery Stenosis       Avery Monroe is a 76 y.o. male who returns today for calcified coronary artery disease, calcium score 214 in May 2018, symptomatic PVCs and dyslipidemia.    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    Remotely told murmur and MVP but I have never heard a murmur, no full echo, only stress echo .    He recalls mild HTN and PVCs and HLP.  PVCs found incidentally, went away with exercise, started Mg and they went away.    Underwent calcium scoring , predominantly LAD, left main was clear.  Previous stress echo in  which was normal.    Has pure hyperlipidemia, on Lipitor 10, LDL 86, had been 129.  Severe foot cramping on simvastatin.    Father  of MI and CVA after kidney failure, in his 90s.  Mother with HTN, stroke, had valve surgery  Former smoker, age 12 to 21. Has AAA screening.  No history of diabetes.  No recent hypertension, no meds.  No history of autoimmune disease such as lupus or rheumatoid arthritis.  No chronic kidney disease.    Prior hemicolectomy for polyp that turned out to be benign.    He exercising vigorously.  He is an open water swimmer/cold swims and runner, did national swim, and training for more.  Does some sprints.  HR at 125 bpm for 30 min, then intervals,     He is not limited by chest pain, pressure or tightness with activity.   No significant dyspnea on exertion, orthopnea or lower extremity swelling.   No significant palpitations, dizziness, or presyncope/syncope.   Mild orthostasis after exercise, brief, passes quickly.  No symptoms of leg claudication.   No stroke/TIA like symptoms.    Lives in [place].    DATA REVIEWED by me:  ECG 2018  Sinus, rate 57    Exercise stress echocardiogram, 2014: He achieved 7 metabolic equivalents and 99% of age-predicted  maximum heart rate with no ischemic ECG nor echocardiographic findings (negative for ischemia)     Coronary calcium scan, 5/8/2018:  Coronary calcification:  LMA - 0.0  LCX - 33.2  LAD - 164.5  RCA - 16.6  Calcium score:  214.4    Carotid ultrasound 2012 mild plaque, no stenosis    Most recent labs:     1/15/2021 hemoglobin 15.9, platelets 207, A1c 5.6, B12 normal, folate normal    11/20/2020 creatinine 1.05, GFR 69, sodium 142, potassium 4.3, LFTs normal, total cholesterol 184, triglycerides 78, HDL 84, LDL 86, sodium 142, potassium 4.3, TSH 1.77      No results found for: HEMOGLOBIN, HEMATOCRIT, MCV, INR, TSH   Lab Results   Component Value Date/Time    SODIUM 142 05/07/2019 07:56 AM    SODIUM 138 05/03/2018 07:35 AM    POTASSIUM 4.3 05/07/2019 07:56 AM    POTASSIUM 4.2 05/03/2018 07:35 AM    CHLORIDE 104 05/07/2019 07:56 AM    CHLORIDE 104 05/03/2018 07:35 AM    CO2 22 05/07/2019 07:56 AM    CO2 25 05/03/2018 07:35 AM    GLUCOSE 98 05/07/2019 07:56 AM    GLUCOSE 78 05/03/2018 07:35 AM    BUN 15 05/07/2019 07:56 AM    BUN 17 05/03/2018 07:35 AM    CREATININE 0.92 05/07/2019 07:56 AM    CREATININE 0.93 05/03/2018 07:35 AM    CREATININE 0.85 04/05/2013 06:40 AM    CREATININE 0.85 04/05/2013 06:40 AM      Lab Results   Component Value Date/Time    ASTSGOT 22 05/07/2019 07:56 AM    ASTSGOT 14 05/03/2018 07:35 AM    ALTSGPT 17 05/07/2019 07:56 AM    ALTSGPT 12 05/03/2018 07:35 AM    ALBUMIN 4.5 05/07/2019 07:56 AM    ALBUMIN 3.9 05/03/2018 07:35 AM      Lab Results   Component Value Date/Time    CHOLSTRLTOT 160 05/07/2019 07:56 AM    CHOLSTRLTOT 187 05/03/2018 07:35 AM    LDL 86 05/07/2019 07:56 AM     (H) 05/03/2018 07:35 AM    HDL 58 05/07/2019 07:56 AM    HDL 53 05/03/2018 07:35 AM    TRIGLYCERIDE 78 05/07/2019 07:56 AM    TRIGLYCERIDE 100 05/03/2018 07:35 AM         5/7/2019 sodium 142, potassium 4.3, creatinine 0.92, LFTs normal, total cholesterol 160, triglycerides 78, HDL 58, LDL 86    Past Medical  History:   Diagnosis Date   • Carotid stenosis    • Hyperlipidemia    • Hypertension    • Mitral regurgitation    • Prostate cancer (HCC)    • PVC (premature ventricular contraction)    • Rheumatic fever      History reviewed. No pertinent surgical history.  Family History   Problem Relation Age of Onset   • Heart Disease Mother         Valve replacement, late 80s   • Stroke Mother    • Hypertension Mother    • Heart Attack Father    • Kidney Disease Father      Social History     Socioeconomic History   • Marital status: Unknown     Spouse name: Not on file   • Number of children: Not on file   • Years of education: Not on file   • Highest education level: Not on file   Occupational History   • Not on file   Tobacco Use   • Smoking status: Never Smoker   • Smokeless tobacco: Never Used   Substance and Sexual Activity   • Alcohol use: No   • Drug use: Yes     Frequency: 7.0 times per week     Types: Marijuana   • Sexual activity: Yes   Other Topics Concern   • Not on file   Social History Narrative   • Not on file     Social Determinants of Health     Financial Resource Strain:    • Difficulty of Paying Living Expenses:    Food Insecurity:    • Worried About Running Out of Food in the Last Year:    • Ran Out of Food in the Last Year:    Transportation Needs:    • Lack of Transportation (Medical):    • Lack of Transportation (Non-Medical):    Physical Activity:    • Days of Exercise per Week:    • Minutes of Exercise per Session:    Stress:    • Feeling of Stress :    Social Connections:    • Frequency of Communication with Friends and Family:    • Frequency of Social Gatherings with Friends and Family:    • Attends [employer] Services:    • Active Member of Clubs or Organizations:    • Attends Club or Organization Meetings:    • Marital Status:    Intimate Partner Violence:    • Fear of Current or Ex-Partner:    • Emotionally Abused:    • Physically Abused:    • Sexually Abused:      Allergies   Allergen Reactions  "  • Statins [Hmg-Coa-R Inhibitors]    • Sulfa Drugs        Current Outpatient Medications   Medication Sig Dispense Refill   • Azelastine HCl 137 MCG/SPRAY Solution Administer 1 Spray into affected nostril(S) 2 times a day. EACH NOSTRIL     • fluticasone (FLONASE) 50 MCG/ACT nasal spray      • IODINE, KELP, PO Take 200 mg by mouth every day.     • atorvastatin (LIPITOR) 10 MG Tab TAKE 1 TABLET BY MOUTH ONCE DAILY IN THE EVENING 90 tablet 0   • magnesium gluconate (MAG-G) 500 MG tablet Take 500 mg by mouth every day.     • Calcium Carbonate-Vitamin D (CALCIUM PLUS VITAMIN D PO) Take  by mouth.     • B Complex Vitamins (B COMPLEX 1 PO) Take  by mouth every day.     • sildenafil citrate (VIAGRA) 100 MG tablet Take 100 mg by mouth as needed.       No current facility-administered medications for this visit.       ROS    All others systems reviewed and negative.     Objective:     /78 (BP Location: Left arm, Patient Position: Sitting, BP Cuff Size: Adult)   Pulse 68   Ht 1.765 m (5' 9.5\")   Wt 72.3 kg (159 lb 4.8 oz)   SpO2 96%  Body mass index is 23.19 kg/m².    Physical Exam   General: No acute distress. Well nourished.  HEENT: EOM grossly intact, no scleral icterus, no pharyngeal erythema.   Neck:  No JVD, no bruits, trachea midline  CVS: RRR. Normal S1, S2. No M/R/G. No LE edema.  2+ radial pulses, 2+ PT pulses  Resp: CTAB. No wheezing or crackles/rhonchi. Normal respiratory effort.  Abdomen: Soft, NT, no russ hepatomegaly.  MSK/Ext: No clubbing or cyanosis.  Skin: Warm and dry, no rashes.  Neurological: CN III-XII grossly intact. No focal deficits.   Psych: A&O x 3, appropriate affect, good judgement    Physical exam performed today and unchanged, except what is noted, compared to 11-6-19      Assessment:     1. Mixed hyperlipidemia     2. Essential hypertension     3. Pure hypercholesterolemia     4. PVC (premature ventricular contraction)     5. Agatston CAC score 200-399     6. Stenosis of carotid " artery, unspecified laterality     7. Dyslipidemia     8. Carotid artery plaque, bilateral     9. Coronary artery calcification seen on CT scan         Medical Decision Making:  Today's Assessment / Status / Plan:     -Cont primary prevention strategy, includes statin  -BP controlled  -Prior heart murmur, possible MVP, I have not heart this so no echo  -Benefit of ASA not known so he is not on it and I agree  -He will cont to exercise and compete in swimming, it is pushing boundaries but all within reason, watch for arrythmia with cold water swimming, he is aware  -Discussed signs/sx of ACS  -Labs with PCP, annual CMP and lipids  -LDL at goal from primary prevention  -RTC 1 year    Return in about 1 year (around 4/21/2022).    It is my pleasure to participate in the care of Mr. Monroe.  Please do not hesitate to contact me with questions or concerns.    Maame Smallwood MD, [facility]  Cardiologist [facility]    Please note that this dictation was created using voice recognition software. I have made every reasonable attempt to correct obvious errors, but it is possible there are errors of grammar and possibly content that I did not discover before finalizing the note.

## 2021-04-21 NOTE — LETTER
[facility]   [address] [place] [address]  Phone: [phone]  Fax: [phone]              Avery Monroe  1945    Encounter Date: 2021    Maame Smallwood M.D.          PROGRESS NOTE:  Subjective:   Chief Complaint:   Chief Complaint   Patient presents with   • HTN (Controlled)   • Premature Ventricular Contractions (PVCs)   • Carotid Artery Stenosis       Avery Monroe is a 76 y.o. male who returns today for calcified coronary artery disease, calcium score 214 in May 2018, symptomatic PVCs and dyslipidemia.    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    Remotely told murmur and MVP but I have never heard a murmur, no full echo, only stress echo .    He recalls mild HTN and PVCs and HLP.  PVCs found incidentally, went away with exercise, started Mg and they went away.    Underwent calcium scoring , predominantly LAD, left main was clear.  Previous stress echo in  which was normal.    Has pure hyperlipidemia, on Lipitor 10, LDL 86, had been 129.  Severe foot cramping on simvastatin.    Father  of MI and CVA after kidney failure, in his 90s.  Mother with HTN, stroke, had valve surgery  Former smoker, age 12 to 21. Has AAA screening.  No history of diabetes.  No recent hypertension, no meds.  No history of autoimmune disease such as lupus or rheumatoid arthritis.  No chronic kidney disease.    Prior hemicolectomy for polyp that turned out to be benign.    He exercising vigorously.  He is an open water swimmer/cold swims and runner, did national swim, and training for more.  Does some sprints.  HR at 125 bpm for 30 min, then intervals,     He is not limited by chest pain, pressure or tightness with activity.   No significant dyspnea on exertion, orthopnea or lower extremity swelling.   No significant palpitations, dizziness, or presyncope/syncope.   Mild orthostasis after  exercise, brief, passes quickly.  No symptoms of leg claudication.   No stroke/TIA like symptoms.    Lives in [place].    DATA REVIEWED by me:  ECG 5/2/2018  Sinus, rate 57    Exercise stress echocardiogram, 5/7/2014: He achieved 7 metabolic equivalents and 99% of age-predicted maximum heart rate with no ischemic ECG nor echocardiographic findings (negative for ischemia)     Coronary calcium scan, 5/8/2018:  Coronary calcification:  LMA - 0.0  LCX - 33.2  LAD - 164.5  RCA - 16.6  Calcium score:  214.4    Carotid ultrasound 2012 mild plaque, no stenosis    Most recent labs:     1/15/2021 hemoglobin 15.9, platelets 207, A1c 5.6, B12 normal, folate normal    11/20/2020 creatinine 1.05, GFR 69, sodium 142, potassium 4.3, LFTs normal, total cholesterol 184, triglycerides 78, HDL 84, LDL 86, sodium 142, potassium 4.3, TSH 1.77      No results found for: HEMOGLOBIN, HEMATOCRIT, MCV, INR, TSH   Lab Results   Component Value Date/Time    SODIUM 142 05/07/2019 07:56 AM    SODIUM 138 05/03/2018 07:35 AM    POTASSIUM 4.3 05/07/2019 07:56 AM    POTASSIUM 4.2 05/03/2018 07:35 AM    CHLORIDE 104 05/07/2019 07:56 AM    CHLORIDE 104 05/03/2018 07:35 AM    CO2 22 05/07/2019 07:56 AM    CO2 25 05/03/2018 07:35 AM    GLUCOSE 98 05/07/2019 07:56 AM    GLUCOSE 78 05/03/2018 07:35 AM    BUN 15 05/07/2019 07:56 AM    BUN 17 05/03/2018 07:35 AM    CREATININE 0.92 05/07/2019 07:56 AM    CREATININE 0.93 05/03/2018 07:35 AM    CREATININE 0.85 04/05/2013 06:40 AM    CREATININE 0.85 04/05/2013 06:40 AM      Lab Results   Component Value Date/Time    ASTSGOT 22 05/07/2019 07:56 AM    ASTSGOT 14 05/03/2018 07:35 AM    ALTSGPT 17 05/07/2019 07:56 AM    ALTSGPT 12 05/03/2018 07:35 AM    ALBUMIN 4.5 05/07/2019 07:56 AM    ALBUMIN 3.9 05/03/2018 07:35 AM      Lab Results   Component Value Date/Time    CHOLSTRLTOT 160 05/07/2019 07:56 AM    CHOLSTRLTOT 187 05/03/2018 07:35 AM    LDL 86 05/07/2019 07:56 AM     (H) 05/03/2018 07:35 AM    HDL 58  05/07/2019 07:56 AM    HDL 53 05/03/2018 07:35 AM    TRIGLYCERIDE 78 05/07/2019 07:56 AM    TRIGLYCERIDE 100 05/03/2018 07:35 AM         5/7/2019 sodium 142, potassium 4.3, creatinine 0.92, LFTs normal, total cholesterol 160, triglycerides 78, HDL 58, LDL 86    Past Medical History:   Diagnosis Date   • Carotid stenosis    • Hyperlipidemia    • Hypertension    • Mitral regurgitation    • Prostate cancer (HCC)    • PVC (premature ventricular contraction)    • Rheumatic fever      History reviewed. No pertinent surgical history.  Family History   Problem Relation Age of Onset   • Heart Disease Mother         Valve replacement, late 80s   • Stroke Mother    • Hypertension Mother    • Heart Attack Father    • Kidney Disease Father      Social History     Socioeconomic History   • Marital status: Unknown     Spouse name: Not on file   • Number of children: Not on file   • Years of education: Not on file   • Highest education level: Not on file   Occupational History   • Not on file   Tobacco Use   • Smoking status: Never Smoker   • Smokeless tobacco: Never Used   Substance and Sexual Activity   • Alcohol use: No   • Drug use: Yes     Frequency: 7.0 times per week     Types: Marijuana   • Sexual activity: Yes   Other Topics Concern   • Not on file   Social History Narrative   • Not on file     Social Determinants of Health     Financial Resource Strain:    • Difficulty of Paying Living Expenses:    Food Insecurity:    • Worried About Running Out of Food in the Last Year:    • Ran Out of Food in the Last Year:    Transportation Needs:    • Lack of Transportation (Medical):    • Lack of Transportation (Non-Medical):    Physical Activity:    • Days of Exercise per Week:    • Minutes of Exercise per Session:    Stress:    • Feeling of Stress :    Social Connections:    • Frequency of Communication with Friends and Family:    • Frequency of Social Gatherings with Friends and Family:    • Attends [employer] Services:    • Active  "Member of Clubs or Organizations:    • Attends Club or Organization Meetings:    • Marital Status:    Intimate Partner Violence:    • Fear of Current or Ex-Partner:    • Emotionally Abused:    • Physically Abused:    • Sexually Abused:      Allergies   Allergen Reactions   • Statins [Hmg-Coa-R Inhibitors]    • Sulfa Drugs        Current Outpatient Medications   Medication Sig Dispense Refill   • Azelastine HCl 137 MCG/SPRAY Solution Administer 1 Spray into affected nostril(S) 2 times a day. EACH NOSTRIL     • fluticasone (FLONASE) 50 MCG/ACT nasal spray      • IODINE, KELP, PO Take 200 mg by mouth every day.     • atorvastatin (LIPITOR) 10 MG Tab TAKE 1 TABLET BY MOUTH ONCE DAILY IN THE EVENING 90 tablet 0   • magnesium gluconate (MAG-G) 500 MG tablet Take 500 mg by mouth every day.     • Calcium Carbonate-Vitamin D (CALCIUM PLUS VITAMIN D PO) Take  by mouth.     • B Complex Vitamins (B COMPLEX 1 PO) Take  by mouth every day.     • sildenafil citrate (VIAGRA) 100 MG tablet Take 100 mg by mouth as needed.       No current facility-administered medications for this visit.       ROS    All others systems reviewed and negative.     Objective:     /78 (BP Location: Left arm, Patient Position: Sitting, BP Cuff Size: Adult)   Pulse 68   Ht 1.765 m (5' 9.5\")   Wt 72.3 kg (159 lb 4.8 oz)   SpO2 96%  Body mass index is 23.19 kg/m².    Physical Exam   General: No acute distress. Well nourished.  HEENT: EOM grossly intact, no scleral icterus, no pharyngeal erythema.   Neck:  No JVD, no bruits, trachea midline  CVS: RRR. Normal S1, S2. No M/R/G. No LE edema.  2+ radial pulses, 2+ PT pulses  Resp: CTAB. No wheezing or crackles/rhonchi. Normal respiratory effort.  Abdomen: Soft, NT, no russ hepatomegaly.  MSK/Ext: No clubbing or cyanosis.  Skin: Warm and dry, no rashes.  Neurological: CN III-XII grossly intact. No focal deficits.   Psych: A&O x 3, appropriate affect, good judgement    Physical exam performed today and " unchanged, except what is noted, compared to 11-6-19      Assessment:     1. Mixed hyperlipidemia     2. Essential hypertension     3. Pure hypercholesterolemia     4. PVC (premature ventricular contraction)     5. Agatston CAC score 200-399     6. Stenosis of carotid artery, unspecified laterality     7. Dyslipidemia     8. Carotid artery plaque, bilateral     9. Coronary artery calcification seen on CT scan         Medical Decision Making:  Today's Assessment / Status / Plan:     -Cont primary prevention strategy, includes statin  -BP controlled  -Prior heart murmur, possible MVP, I have not heart this so no echo  -Benefit of ASA not known so he is not on it and I agree  -He will cont to exercise and compete in swimming, it is pushing boundaries but all within reason, watch for arrythmia with cold water swimming, he is aware  -Discussed signs/sx of ACS  -Labs with PCP, annual CMP and lipids  -LDL at goal from primary prevention  -RTC 1 year    Return in about 1 year (around 4/21/2022).    It is my pleasure to participate in the care of Mr. Monroe.  Please do not hesitate to contact me with questions or concerns.    Maame Smallwood MD, [facility]  Cardiologist [facility]    Please note that this dictation was created using voice recognition software. I have made every reasonable attempt to correct obvious errors, but it is possible there are errors of grammar and possibly content that I did not discover before finalizing the note.        Damien Dinh M.D.  [address] [place] [id number]  Via Fax: [phone]

## 2021-06-27 DIAGNOSIS — E78.2 MIXED HYPERLIPIDEMIA: ICD-10-CM

## 2021-06-28 RX ORDER — ATORVASTATIN CALCIUM 10 MG/1
TABLET, FILM COATED ORAL
Qty: 90 TABLET | Refills: 3 | Status: SHIPPED | OUTPATIENT
Start: 2021-06-28 | End: 2022-06-29 | Stop reason: SDUPTHER

## 2022-04-17 NOTE — PROGRESS NOTES
Subjective:   Chief Complaint:   Chief Complaint   Patient presents with   • Hyperlipidemia   • Hypertension   • Premature Ventricular Contractions (PVCs)     Avery Monroe is a 77 y.o. male who returns today for calcified coronary artery disease, HLP, prior symptomatic PVCs.    He recalls mild HTN but never needed meds.    PVCs found incidentally, went away with exercise, started Mg and they went away.    Underwent calcium scoring , predominantly LAD, left main was clear.  Previous stress echo in  which was normal.    Has pure hyperlipidemia, on Lipitor 10, LDL 87, had been 129 prior to statins.  Severe foot cramping on simvastatin.    Was told rheumatic fever at the age of 4-5, previous cardiologist said murmur and possible mitral valve prolapse.  I have never been able to appreciate a significant murmur.  No full echo, just stress echo .    Father  of MI and CVA after kidney failure, in his 90s.  Mother with HTN, stroke, had valve surgery  Former smoker, age 12 to 21. Has AAA screening.  No history of diabetes.  No recent hypertension, no meds.  No history of autoimmune disease such as lupus or rheumatoid arthritis.  No chronic kidney disease.    Prior hemicolectomy for polyp that turned out to be benign.    He exercising vigorously and is a super athlete for his age.  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  HR at 125 bpm for 30 min, then intervals.   No heart rhythm changes.    He feels like he is slowing down.  He has asked about very high protein diet as a trial to see if he does better, he has done research, wanted me to know in case some of his labs are out of range.    He is not limited by chest pain, pressure or tightness with activity.   No significant dyspnea on exertion, orthopnea or lower extremity swelling.   No significant palpitations, lightheadedness or presyncope/syncope.   Mild orthostasis after exercise, brief, passes  quickly.  No symptoms of leg claudication.   No stroke/TIA like symptoms.    Lives in [place].  .    DATA REVIEWED by me:  ECG 5/2/2018  Sinus, rate 57    Exercise stress echocardiogram, 5/7/2014: He achieved 7 metabolic equivalents and 99% of age-predicted maximum heart rate with no ischemic ECG nor echocardiographic findings (negative for ischemia)     Coronary calcium scan, 5/8/2018:  Coronary calcification:  LMA - 0.0  LCX - 33.2  LAD - 164.5  RCA - 16.6  Calcium score:  214.4    Carotid ultrasound 2012 mild plaque, no stenosis    Most recent labs:     Lab for 11/30/2021 total cholesterol 162, triglycerides 103, HDL 56, LDL 87, glucose 96, BUN 19, creatinine 0.84, sodium 137, potassium 4.6, LFTs normal    1/15/2021 hemoglobin 15.9, platelets 207, A1c 5.6, B12 normal, folate normal    11/20/2020 creatinine 1.05, GFR 69, sodium 142, potassium 4.3, LFTs normal, total cholesterol 184, triglycerides 78, HDL 84, LDL 86, sodium 142, potassium 4.3, TSH 1.77      No results found for: HEMOGLOBIN, HEMATOCRIT, MCV, INR, TSH   Lab Results   Component Value Date/Time    SODIUM 142 05/07/2019 07:56 AM    SODIUM 138 05/03/2018 07:35 AM    POTASSIUM 4.3 05/07/2019 07:56 AM    POTASSIUM 4.2 05/03/2018 07:35 AM    CHLORIDE 104 05/07/2019 07:56 AM    CHLORIDE 104 05/03/2018 07:35 AM    CO2 22 05/07/2019 07:56 AM    CO2 25 05/03/2018 07:35 AM    GLUCOSE 98 05/07/2019 07:56 AM    GLUCOSE 78 05/03/2018 07:35 AM    BUN 15 05/07/2019 07:56 AM    BUN 17 05/03/2018 07:35 AM    CREATININE 0.92 05/07/2019 07:56 AM    CREATININE 0.93 05/03/2018 07:35 AM    CREATININE 0.85 04/05/2013 06:40 AM    CREATININE 0.85 04/05/2013 06:40 AM      Lab Results   Component Value Date/Time    ASTSGOT 22 05/07/2019 07:56 AM    ASTSGOT 14 05/03/2018 07:35 AM    ALTSGPT 17 05/07/2019 07:56 AM    ALTSGPT 12 05/03/2018 07:35 AM    ALBUMIN 4.5 05/07/2019 07:56 AM    ALBUMIN 3.9 05/03/2018 07:35 AM      Lab Results   Component Value Date/Time    CHOLSTRLTOT  160 05/07/2019 07:56 AM    CHOLSTRLTOT 187 05/03/2018 07:35 AM    LDL 86 05/07/2019 07:56 AM     (H) 05/03/2018 07:35 AM    HDL 58 05/07/2019 07:56 AM    HDL 53 05/03/2018 07:35 AM    TRIGLYCERIDE 78 05/07/2019 07:56 AM    TRIGLYCERIDE 100 05/03/2018 07:35 AM         5/7/2019 sodium 142, potassium 4.3, creatinine 0.92, LFTs normal, total cholesterol 160, triglycerides 78, HDL 58, LDL 86    Past Medical History:   Diagnosis Date   • Carotid stenosis    • Hyperlipidemia    • Hypertension    • Mitral regurgitation    • Prostate cancer (HCC)    • PVC (premature ventricular contraction)    • Rheumatic fever      History reviewed. No pertinent surgical history.  Family History   Problem Relation Age of Onset   • Heart Disease Mother         Valve replacement, late 80s   • Stroke Mother    • Hypertension Mother    • Heart Attack Father    • Kidney Disease Father      Social History     Socioeconomic History   • Marital status:      Spouse name: Not on file   • Number of children: Not on file   • Years of education: Not on file   • Highest education level: Not on file   Occupational History   • Not on file   Tobacco Use   • Smoking status: Never Smoker   • Smokeless tobacco: Never Used   Substance and Sexual Activity   • Alcohol use: No   • Drug use: Yes     Frequency: 7.0 times per week     Types: Marijuana   • Sexual activity: Yes   Other Topics Concern   • Not on file   Social History Narrative   • Not on file     Social Determinants of Health     Financial Resource Strain: Not on file   Food Insecurity: Not on file   Transportation Needs: Not on file   Physical Activity: Not on file   Stress: Not on file   Social Connections: Not on file   Intimate Partner Violence: Not on file   Housing Stability: Not on file     Allergies   Allergen Reactions   • Statins [Hmg-Coa-R Inhibitors]    • Sulfa Drugs        Current Outpatient Medications   Medication Sig Dispense Refill   • vitamin D3 (CHOLECALCIFEROL) 1000  "Unit (25 mcg) Tab Take 1,000 Units by mouth every day.     • Menaquinone-7 (VITAMIN K2 PO) Take  by mouth.     • atorvastatin (LIPITOR) 10 MG Tab TAKE 1 TABLET BY MOUTH ONCE DAILY IN THE EVENING 90 tablet 3   • IODINE, KELP, PO Take 200 mg by mouth every day.     • magnesium gluconate (MAG-G) 500 MG tablet Take 500 mg by mouth every day.     • Calcium Carbonate-Vitamin D (CALCIUM PLUS VITAMIN D PO) Take  by mouth.     • B Complex Vitamins (B COMPLEX 1 PO) Take  by mouth every day.     • sildenafil citrate (VIAGRA) 100 MG tablet Take 100 mg by mouth as needed.       No current facility-administered medications for this visit.       ROS    All others systems reviewed and negative.     Objective:     /76 (BP Location: Left arm, Patient Position: Sitting, BP Cuff Size: Adult)   Pulse 68   Resp 16   Ht 1.765 m (5' 9.5\")   Wt 74 kg (163 lb 2.3 oz)   SpO2 95%  Body mass index is 23.75 kg/m².    Physical Exam   General: No acute distress. Well nourished.  HEENT: EOM grossly intact, no scleral icterus, no pharyngeal erythema.   Neck:  No JVD at 90, no bruits, trachea midline  CVS: RRR. Normal S1, phys split S2. No LE edema.  2+ radial pulses, 2+ PT pulses  Resp: CTAB. No wheezing or crackles/rhonchi. Normal respiratory effort.  Abdomen: Soft, NT, no russ hepatomegaly.  MSK/Ext: No clubbing or cyanosis.  Skin: Warm and dry, no rashes.  Neurological: CN III-XII grossly intact. No focal deficits.   Psych: A&O x 3, appropriate affect, good judgement    Physical exam performed today and unchanged, except what is noted, compared to 4-21-21      Assessment:     1. Mixed hyperlipidemia     2. Essential hypertension     3. PVC (premature ventricular contraction)     4. Agatston CAC score 200-399     5. Stenosis of carotid artery, unspecified laterality     6. Coronary artery calcification seen on CT scan         Medical Decision Making:  Today's Assessment / Status / Plan:     -Cont primary prevention strategy, includes " statin  -BP controlled without meds.  -Was told rheumatic fever as a child but no sequelae and physical exam, just the split S2  -He will cont to exercise and compete in swimming, it is pushing boundaries but all within reason, watch for arrythmia with cold water swimming, he is aware  -Labs with PCP, annual CMP and lipids  -LDL at goal from primary prevention  -He has asked about very high protein diet as a trial to see if he does better, he has done research, wanted me to know in case some of his labs are out of range.  -RTC 1 year    Written instructions given today:  None    Return in about 1 year (around 4/20/2023).    It is my pleasure to participate in the care of Mr. Monroe.  Please do not hesitate to contact me with questions or concerns.    Maame Smallwood MD, [facility]  Cardiologist [facility]    Please note that this dictation was created using voice recognition software. I have made every reasonable attempt to correct obvious errors, but it is possible there are errors of grammar and possibly content that I did not discover before finalizing the note.

## 2022-04-20 ENCOUNTER — OFFICE VISIT (OUTPATIENT)
Dept: CARDIOLOGY | Facility: PHYSICIAN GROUP | Age: 77
End: 2022-04-20
Payer: MEDICARE

## 2022-04-20 VITALS
BODY MASS INDEX: 23.36 KG/M2 | HEIGHT: 70 IN | WEIGHT: 163.14 LBS | DIASTOLIC BLOOD PRESSURE: 76 MMHG | HEART RATE: 68 BPM | SYSTOLIC BLOOD PRESSURE: 124 MMHG | RESPIRATION RATE: 16 BRPM | OXYGEN SATURATION: 95 %

## 2022-04-20 DIAGNOSIS — I49.3 PVC (PREMATURE VENTRICULAR CONTRACTION): ICD-10-CM

## 2022-04-20 DIAGNOSIS — I10 ESSENTIAL HYPERTENSION: ICD-10-CM

## 2022-04-20 DIAGNOSIS — R93.1 AGATSTON CAC SCORE 200-399: ICD-10-CM

## 2022-04-20 DIAGNOSIS — I25.10 CORONARY ARTERY CALCIFICATION SEEN ON CT SCAN: ICD-10-CM

## 2022-04-20 DIAGNOSIS — E78.2 MIXED HYPERLIPIDEMIA: ICD-10-CM

## 2022-04-20 DIAGNOSIS — I65.29 STENOSIS OF CAROTID ARTERY, UNSPECIFIED LATERALITY: ICD-10-CM

## 2022-04-20 PROCEDURE — 99214 OFFICE O/P EST MOD 30 MIN: CPT | Performed by: INTERNAL MEDICINE

## 2022-04-20 RX ORDER — VITAMIN B COMPLEX
1000 TABLET ORAL DAILY
COMMUNITY

## 2022-04-20 NOTE — LETTER
[facility]   [address]  [place] [address]  Phone: [phone]  Fax: [phone]              Avery Monroe  1945    Encounter Date: 2022    Maame Smallwood M.D.          PROGRESS NOTE:  Subjective:   Chief Complaint:   Chief Complaint   Patient presents with   • Hyperlipidemia   • Hypertension   • Premature Ventricular Contractions (PVCs)     Avery Monroe is a 77 y.o. male who returns today for calcified coronary artery disease, HLP, prior symptomatic PVCs.    He recalls mild HTN but never needed meds.    PVCs found incidentally, went away with exercise, started Mg and they went away.    Underwent calcium scoring , predominantly LAD, left main was clear.  Previous stress echo in  which was normal.    Has pure hyperlipidemia, on Lipitor 10, LDL 87, had been 129 prior to statins.  Severe foot cramping on simvastatin.    Was told rheumatic fever at the age of 4-5, previous cardiologist said murmur and possible mitral valve prolapse.  I have never been able to appreciate a significant murmur.  No full echo, just stress echo .    Father  of MI and CVA after kidney failure, in his 90s.  Mother with HTN, stroke, had valve surgery  Former smoker, age 12 to 21. Has AAA screening.  No history of diabetes.  No recent hypertension, no meds.  No history of autoimmune disease such as lupus or rheumatoid arthritis.  No chronic kidney disease.    Prior hemicolectomy for polyp that turned out to be benign.    He exercising vigorously and is a super athlete for his age.  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  HR at 125 bpm for 30 min, then intervals.   No heart rhythm changes.    He feels like he is slowing down.  He has asked about very high protein diet as a trial to see if he does better, he has done research, wanted me to know in case some of his  labs are out of range.    He is not limited by chest pain, pressure or tightness with activity.   No significant dyspnea on exertion, orthopnea or lower extremity swelling.   No significant palpitations, lightheadedness or presyncope/syncope.   Mild orthostasis after exercise, brief, passes quickly.  No symptoms of leg claudication.   No stroke/TIA like symptoms.    Lives in [place].  .    DATA REVIEWED by me:  ECG 5/2/2018  Sinus, rate 57    Exercise stress echocardiogram, 5/7/2014: He achieved 7 metabolic equivalents and 99% of age-predicted maximum heart rate with no ischemic ECG nor echocardiographic findings (negative for ischemia)     Coronary calcium scan, 5/8/2018:  Coronary calcification:  LMA - 0.0  LCX - 33.2  LAD - 164.5  RCA - 16.6  Calcium score:  214.4    Carotid ultrasound 2012 mild plaque, no stenosis    Most recent labs:     Lab for 11/30/2021 total cholesterol 162, triglycerides 103, HDL 56, LDL 87, glucose 96, BUN 19, creatinine 0.84, sodium 137, potassium 4.6, LFTs normal    1/15/2021 hemoglobin 15.9, platelets 207, A1c 5.6, B12 normal, folate normal    11/20/2020 creatinine 1.05, GFR 69, sodium 142, potassium 4.3, LFTs normal, total cholesterol 184, triglycerides 78, HDL 84, LDL 86, sodium 142, potassium 4.3, TSH 1.77      No results found for: HEMOGLOBIN, HEMATOCRIT, MCV, INR, TSH   Lab Results   Component Value Date/Time    SODIUM 142 05/07/2019 07:56 AM    SODIUM 138 05/03/2018 07:35 AM    POTASSIUM 4.3 05/07/2019 07:56 AM    POTASSIUM 4.2 05/03/2018 07:35 AM    CHLORIDE 104 05/07/2019 07:56 AM    CHLORIDE 104 05/03/2018 07:35 AM    CO2 22 05/07/2019 07:56 AM    CO2 25 05/03/2018 07:35 AM    GLUCOSE 98 05/07/2019 07:56 AM    GLUCOSE 78 05/03/2018 07:35 AM    BUN 15 05/07/2019 07:56 AM    BUN 17 05/03/2018 07:35 AM    CREATININE 0.92 05/07/2019 07:56 AM    CREATININE 0.93 05/03/2018 07:35 AM    CREATININE 0.85 04/05/2013 06:40 AM    CREATININE 0.85 04/05/2013 06:40 AM      Lab Results    Component Value Date/Time    ASTSGOT 22 05/07/2019 07:56 AM    ASTSGOT 14 05/03/2018 07:35 AM    ALTSGPT 17 05/07/2019 07:56 AM    ALTSGPT 12 05/03/2018 07:35 AM    ALBUMIN 4.5 05/07/2019 07:56 AM    ALBUMIN 3.9 05/03/2018 07:35 AM      Lab Results   Component Value Date/Time    CHOLSTRLTOT 160 05/07/2019 07:56 AM    CHOLSTRLTOT 187 05/03/2018 07:35 AM    LDL 86 05/07/2019 07:56 AM     (H) 05/03/2018 07:35 AM    HDL 58 05/07/2019 07:56 AM    HDL 53 05/03/2018 07:35 AM    TRIGLYCERIDE 78 05/07/2019 07:56 AM    TRIGLYCERIDE 100 05/03/2018 07:35 AM         5/7/2019 sodium 142, potassium 4.3, creatinine 0.92, LFTs normal, total cholesterol 160, triglycerides 78, HDL 58, LDL 86    Past Medical History:   Diagnosis Date   • Carotid stenosis    • Hyperlipidemia    • Hypertension    • Mitral regurgitation    • Prostate cancer (HCC)    • PVC (premature ventricular contraction)    • Rheumatic fever      History reviewed. No pertinent surgical history.  Family History   Problem Relation Age of Onset   • Heart Disease Mother         Valve replacement, late 80s   • Stroke Mother    • Hypertension Mother    • Heart Attack Father    • Kidney Disease Father      Social History     Socioeconomic History   • Marital status:      Spouse name: Not on file   • Number of children: Not on file   • Years of education: Not on file   • Highest education level: Not on file   Occupational History   • Not on file   Tobacco Use   • Smoking status: Never Smoker   • Smokeless tobacco: Never Used   Substance and Sexual Activity   • Alcohol use: No   • Drug use: Yes     Frequency: 7.0 times per week     Types: Marijuana   • Sexual activity: Yes   Other Topics Concern   • Not on file   Social History Narrative   • Not on file     Social Determinants of Health     Financial Resource Strain: Not on file   Food Insecurity: Not on file   Transportation Needs: Not on file   Physical Activity: Not on file   Stress: Not on file   Social  "Connections: Not on file   Intimate Partner Violence: Not on file   Housing Stability: Not on file     Allergies   Allergen Reactions   • Statins [Hmg-Coa-R Inhibitors]    • Sulfa Drugs        Current Outpatient Medications   Medication Sig Dispense Refill   • vitamin D3 (CHOLECALCIFEROL) 1000 Unit (25 mcg) Tab Take 1,000 Units by mouth every day.     • Menaquinone-7 (VITAMIN K2 PO) Take  by mouth.     • atorvastatin (LIPITOR) 10 MG Tab TAKE 1 TABLET BY MOUTH ONCE DAILY IN THE EVENING 90 tablet 3   • IODINE, KELP, PO Take 200 mg by mouth every day.     • magnesium gluconate (MAG-G) 500 MG tablet Take 500 mg by mouth every day.     • Calcium Carbonate-Vitamin D (CALCIUM PLUS VITAMIN D PO) Take  by mouth.     • B Complex Vitamins (B COMPLEX 1 PO) Take  by mouth every day.     • sildenafil citrate (VIAGRA) 100 MG tablet Take 100 mg by mouth as needed.       No current facility-administered medications for this visit.       ROS    All others systems reviewed and negative.     Objective:     /76 (BP Location: Left arm, Patient Position: Sitting, BP Cuff Size: Adult)   Pulse 68   Resp 16   Ht 1.765 m (5' 9.5\")   Wt 74 kg (163 lb 2.3 oz)   SpO2 95%  Body mass index is 23.75 kg/m².    Physical Exam   General: No acute distress. Well nourished.  HEENT: EOM grossly intact, no scleral icterus, no pharyngeal erythema.   Neck:  No JVD at 90, no bruits, trachea midline  CVS: RRR. Normal S1, phys split S2. No LE edema.  2+ radial pulses, 2+ PT pulses  Resp: CTAB. No wheezing or crackles/rhonchi. Normal respiratory effort.  Abdomen: Soft, NT, no russ hepatomegaly.  MSK/Ext: No clubbing or cyanosis.  Skin: Warm and dry, no rashes.  Neurological: CN III-XII grossly intact. No focal deficits.   Psych: A&O x 3, appropriate affect, good judgement    Physical exam performed today and unchanged, except what is noted, compared to 4-21-21      Assessment:     1. Mixed hyperlipidemia     2. Essential hypertension     3. PVC " (premature ventricular contraction)     4. Agatston CAC score 200-399     5. Stenosis of carotid artery, unspecified laterality     6. Coronary artery calcification seen on CT scan         Medical Decision Making:  Today's Assessment / Status / Plan:     -Cont primary prevention strategy, includes statin  -BP controlled without meds.  -Was told rheumatic fever as a child but no sequelae and physical exam, just the split S2  -He will cont to exercise and compete in swimming, it is pushing boundaries but all within reason, watch for arrythmia with cold water swimming, he is aware  -Labs with PCP, annual CMP and lipids  -LDL at goal from primary prevention  -He has asked about very high protein diet as a trial to see if he does better, he has done research, wanted me to know in case some of his labs are out of range.  -RTC 1 year    Written instructions given today:  None    Return in about 1 year (around 4/20/2023).    It is my pleasure to participate in the care of Mr. Monroe.  Please do not hesitate to contact me with questions or concerns.    Maame Smallwood MD, [facility]  Cardiologist [facility]    Please note that this dictation was created using voice recognition software. I have made every reasonable attempt to correct obvious errors, but it is possible there are errors of grammar and possibly content that I did not discover before finalizing the note.          No Recipients

## 2022-06-29 DIAGNOSIS — E78.2 MIXED HYPERLIPIDEMIA: ICD-10-CM

## 2022-07-01 RX ORDER — ATORVASTATIN CALCIUM 10 MG/1
TABLET, FILM COATED ORAL
Qty: 90 TABLET | Refills: 3 | Status: SHIPPED | OUTPATIENT
Start: 2022-07-01 | End: 2023-06-28 | Stop reason: SDUPTHER

## 2022-11-08 ENCOUNTER — PATIENT MESSAGE (OUTPATIENT)
Dept: HEALTH INFORMATION MANAGEMENT | Facility: OTHER | Age: 77
End: 2022-11-08

## 2023-06-28 DIAGNOSIS — E78.2 MIXED HYPERLIPIDEMIA: ICD-10-CM

## 2023-06-29 RX ORDER — ATORVASTATIN CALCIUM 10 MG/1
TABLET, FILM COATED ORAL
Qty: 90 TABLET | Refills: 0 | Status: SHIPPED | OUTPATIENT
Start: 2023-06-29 | End: 2023-10-10 | Stop reason: SDUPTHER

## 2023-06-29 NOTE — TELEPHONE ENCOUNTER
Is the patient due for a refill? Yes- courtesy refill    Was the patient seen the past year? No    Date of last office visit: 4/20/2022    Does the patient have an upcoming appointment?  No   If yes, When?     Provider to refill:AL, ADD for LS    Does the patients insurance require a 100 day supply?  No

## 2023-10-07 DIAGNOSIS — E78.2 MIXED HYPERLIPIDEMIA: ICD-10-CM

## 2023-10-07 RX ORDER — ATORVASTATIN CALCIUM 10 MG/1
TABLET, FILM COATED ORAL
Qty: 90 TABLET | Refills: 0 | Status: CANCELLED | OUTPATIENT
Start: 2023-10-07

## 2023-10-09 RX ORDER — ATORVASTATIN CALCIUM 10 MG/1
TABLET, FILM COATED ORAL
Qty: 90 TABLET | Refills: 0 | OUTPATIENT
Start: 2023-10-09

## 2023-10-09 NOTE — TELEPHONE ENCOUNTER
Is the patient due for a refill? Yes    Was the patient seen the past year? No    Date of last office visit: 4/20/22    Does the patient have an upcoming appointment?  No       Provider to refill:TW, ADD     Does the patients insurance require a 100 day supply?  No

## 2023-10-10 ENCOUNTER — TELEPHONE (OUTPATIENT)
Dept: CARDIOLOGY | Facility: MEDICAL CENTER | Age: 78
End: 2023-10-10
Payer: MEDICARE

## 2023-10-10 DIAGNOSIS — E78.2 MIXED HYPERLIPIDEMIA: ICD-10-CM

## 2023-10-10 RX ORDER — ATORVASTATIN CALCIUM 10 MG/1
TABLET, FILM COATED ORAL
Qty: 90 TABLET | Refills: 0 | Status: SHIPPED | OUTPATIENT
Start: 2023-10-10 | End: 2023-12-20 | Stop reason: SDUPTHER

## 2023-10-10 NOTE — TELEPHONE ENCOUNTER
To SC: okay to provide refill up until appt. On 12/20 as ADA? Prior patient of Dr. Smallwood, thank you!

## 2023-10-10 NOTE — TELEPHONE ENCOUNTER
JOSE Mejía.  You 1 hour ago (1:05 PM)     Yes ok to fill. SC      Medication reordered at this time.

## 2023-10-10 NOTE — TELEPHONE ENCOUNTER
ADD    Caller: Avery Monroe     Medication Name and Dosage: atorvastatin (LIPITOR) 10 MG Tab      Medication amount left: None    Preferred Pharmacy: [facility] in [place]    Other questions (Topic): Patient is hoping to get a refill to last him till his next appointment.     Callback Number (Will only call for issues): [phone] (home)      Please advise.    Thank you,     Tiffani INFANTE

## 2023-12-20 ENCOUNTER — OFFICE VISIT (OUTPATIENT)
Dept: CARDIOLOGY | Facility: PHYSICIAN GROUP | Age: 78
End: 2023-12-20
Payer: MEDICARE

## 2023-12-20 VITALS
BODY MASS INDEX: 22.9 KG/M2 | SYSTOLIC BLOOD PRESSURE: 110 MMHG | HEIGHT: 70 IN | HEART RATE: 73 BPM | OXYGEN SATURATION: 94 % | DIASTOLIC BLOOD PRESSURE: 68 MMHG | RESPIRATION RATE: 16 BRPM | WEIGHT: 160 LBS

## 2023-12-20 DIAGNOSIS — I49.3 PVC (PREMATURE VENTRICULAR CONTRACTION): ICD-10-CM

## 2023-12-20 DIAGNOSIS — R93.1 AGATSTON CAC SCORE 200-399: ICD-10-CM

## 2023-12-20 DIAGNOSIS — I10 ESSENTIAL HYPERTENSION: ICD-10-CM

## 2023-12-20 DIAGNOSIS — E78.2 MIXED HYPERLIPIDEMIA: ICD-10-CM

## 2023-12-20 PROCEDURE — 3074F SYST BP LT 130 MM HG: CPT | Performed by: NURSE PRACTITIONER

## 2023-12-20 PROCEDURE — 99214 OFFICE O/P EST MOD 30 MIN: CPT | Performed by: NURSE PRACTITIONER

## 2023-12-20 PROCEDURE — 3078F DIAST BP <80 MM HG: CPT | Performed by: NURSE PRACTITIONER

## 2023-12-20 RX ORDER — ATORVASTATIN CALCIUM 10 MG/1
TABLET, FILM COATED ORAL
Qty: 100 TABLET | Refills: 3 | Status: SHIPPED | OUTPATIENT
Start: 2023-12-20

## 2023-12-20 RX ORDER — FLOMAX 0.4 MG/1
0.4 CAPSULE ORAL DAILY
COMMUNITY
Start: 2023-07-20

## 2023-12-20 ASSESSMENT — ENCOUNTER SYMPTOMS
FEVER: 0
COUGH: 0
PND: 0
ORTHOPNEA: 0
SHORTNESS OF BREATH: 0
BRUISES/BLEEDS EASILY: 0
HEADACHES: 0
INSOMNIA: 0
PALPITATIONS: 0
CHILLS: 0
DIZZINESS: 0
MYALGIAS: 0
ABDOMINAL PAIN: 0
LOSS OF CONSCIOUSNESS: 0
NAUSEA: 0

## 2023-12-20 NOTE — PROGRESS NOTES
Chief Complaint   Patient presents with    Follow-Up    HTN (Controlled)    Hyperlipidemia     History of elevated CTCS score    Premature Ventricular Contractions (PVCs)              Subjective     Avery Monroe is a 78 y.o. male who presents today for annual follow-up of HTN, hyperlipidemia, elevated CTCS score and PVCs.    Avery is a 78 year old male with history of hypertension, hyperlipidemia/elevated CTCS score and PVCs, last seen by Dr. Smallwood in June 2022.    Since the last visit, he has been stable: BP is well controlled. He is due to have labs for his PCP in February 2024. He denies any chest pain, pressure, tightness or discomfort; no palpitations, as he takes magnesium; no shortness of breath, orthopnea or PND; no dizziness or syncope; no LE edema.     Past Medical History:   Diagnosis Date    Carotid stenosis     Hyperlipidemia     Hypertension     Mitral regurgitation     Prostate cancer (HCC)     PVC (premature ventricular contraction)     Rheumatic fever      History reviewed. No pertinent surgical history.  Family History   Problem Relation Age of Onset    Heart Disease Mother         Valve replacement, late 80s    Stroke Mother     Hypertension Mother     Heart Attack Father     Kidney Disease Father      Social History     Socioeconomic History    Marital status:      Spouse name: Not on file    Number of children: Not on file    Years of education: Not on file    Highest education level: Not on file   Occupational History    Not on file   Tobacco Use    Smoking status: Never    Smokeless tobacco: Never   Substance and Sexual Activity    Alcohol use: No    Drug use: Yes     Frequency: 7.0 times per week     Types: Marijuana    Sexual activity: Yes   Other Topics Concern    Not on file   Social History Narrative    Not on file     Social Determinants of Health     Financial Resource Strain: Not on file   Food Insecurity: Not on file   Transportation Needs: Not on file   Physical  "Activity: Not on file   Stress: Not on file   Social Connections: Not on file   Intimate Partner Violence: Not on file   Housing Stability: Not on file     Allergies   Allergen Reactions    Statins [Hmg-Coa-R Inhibitors]     Sulfa Drugs      Outpatient Encounter Medications as of 12/20/2023   Medication Sig Dispense Refill    FLOMAX 0.4 MG capsule Take 0.4 mg by mouth every day.      atorvastatin (LIPITOR) 10 MG Tab TAKE 1 TABLET BY MOUTH ONCE DAILY IN THE EVENING 100 Tablet 3    vitamin D3 (CHOLECALCIFEROL) 1000 Unit (25 mcg) Tab Take 1,000 Units by mouth every day.      Menaquinone-7 (VITAMIN K2 PO) Take  by mouth.      IODINE, KELP, PO Take 200 mg by mouth every day.      magnesium gluconate (MAG-G) 500 MG tablet Take 500 mg by mouth every day.      Calcium Carbonate-Vitamin D (CALCIUM PLUS VITAMIN D PO) Take  by mouth.      B Complex Vitamins (B COMPLEX 1 PO) Take  by mouth every day.      [DISCONTINUED] atorvastatin (LIPITOR) 10 MG Tab TAKE 1 TABLET BY MOUTH ONCE DAILY IN THE EVENING 90 Tablet 0    [DISCONTINUED] sildenafil citrate (VIAGRA) 100 MG tablet Take 100 mg by mouth as needed.       No facility-administered encounter medications on file as of 12/20/2023.     Review of Systems   Constitutional:  Negative for chills and fever.   HENT:  Negative for congestion.    Respiratory:  Negative for cough and shortness of breath.    Cardiovascular:  Negative for chest pain, palpitations, orthopnea, leg swelling and PND.   Gastrointestinal:  Negative for abdominal pain and nausea.   Musculoskeletal:  Negative for myalgias.   Skin:  Negative for rash.   Neurological:  Negative for dizziness, loss of consciousness and headaches.   Endo/Heme/Allergies:  Does not bruise/bleed easily.   Psychiatric/Behavioral:  The patient does not have insomnia.               Objective     /68 (BP Location: Left arm, Patient Position: Sitting, BP Cuff Size: Adult)   Pulse 73   Resp 16   Ht 1.778 m (5' 10\")   Wt 72.6 kg (160 " lb)   SpO2 94%   BMI 22.96 kg/m²     Physical Exam  Constitutional:       Appearance: He is well-developed.   HENT:      Head: Normocephalic.   Neck:      Vascular: No JVD.   Cardiovascular:      Rate and Rhythm: Normal rate and regular rhythm.      Heart sounds: Normal heart sounds.   Pulmonary:      Effort: Pulmonary effort is normal. No respiratory distress.      Breath sounds: Normal breath sounds. No wheezing or rales.   Abdominal:      General: Bowel sounds are normal. There is no distension.      Palpations: Abdomen is soft.      Tenderness: There is no abdominal tenderness.   Musculoskeletal:         General: Normal range of motion.      Cervical back: Normal range of motion and neck supple.   Skin:     General: Skin is warm and dry.      Findings: No rash.   Neurological:      Mental Status: He is alert and oriented to person, place, and time.   Psychiatric:         Mood and Affect: Mood normal.         Behavior: Behavior normal.       FINDINGS OF CTCS OF 5/7/2018:  Coronary calcification:  LMA - 0.0  LCX - 33.2  LAD - 164.5  RCA - 16.6  Calcium score:  214.4     CONCLUSIONS OF STRESS ECHO OF 5/7/2014:  Negative stress echo for ischemia.   Fair exercise tolerance, achieving 7.0 METS and 99 % of max predicted heart rate.   Cardiac chamber sizes and left ventricular systolic function are normal at rest   Appropriate augmentation of left ventricular function after maximal exercise with decrease in end-systolic dimensions       Assessment & Plan     1. Essential hypertension        2. Mixed hyperlipidemia  atorvastatin (LIPITOR) 10 MG Tab      3. Agatston CAC score 200-399        4. PVC (premature ventricular contraction)            Medical Decision Making: Today's Assessment/Status/Plan:      1. Hypertension, not currently on any therapy. BP Is well controlled.    2. Hyperlipidemia/elevated CTCS score, on Lipitor 10mg; unable to tolerate higher doses. Lipids are stable.    3. PVCs, stable on Magnesium 500mg  once daily.    He is doing well, and remains stable. Same medications for now. Follow-up in 6-12 months, sooner if clinical condition changes.

## 2024-02-21 LAB
CHOLEST SERPL-MCNC: 149 MG/DL
HDLC SERPL-MCNC: 63 MG/DL
LDLC SERPL CALC-MCNC: 69 MG/DL
TRIGL SERPL-MCNC: 89 MG/DL

## 2024-09-04 ENCOUNTER — APPOINTMENT (OUTPATIENT)
Dept: CARDIOLOGY | Facility: PHYSICIAN GROUP | Age: 79
End: 2024-09-04
Payer: MEDICARE

## 2024-09-04 VITALS
BODY MASS INDEX: 23.36 KG/M2 | RESPIRATION RATE: 16 BRPM | DIASTOLIC BLOOD PRESSURE: 72 MMHG | SYSTOLIC BLOOD PRESSURE: 104 MMHG | OXYGEN SATURATION: 94 % | HEIGHT: 70 IN | WEIGHT: 163.14 LBS | HEART RATE: 74 BPM

## 2024-09-04 DIAGNOSIS — I49.3 PVC (PREMATURE VENTRICULAR CONTRACTION): ICD-10-CM

## 2024-09-04 DIAGNOSIS — E78.2 MIXED HYPERLIPIDEMIA: ICD-10-CM

## 2024-09-04 DIAGNOSIS — I10 ESSENTIAL HYPERTENSION: ICD-10-CM

## 2024-09-04 DIAGNOSIS — R93.1 AGATSTON CAC SCORE 200-399: ICD-10-CM

## 2024-09-04 PROCEDURE — 3074F SYST BP LT 130 MM HG: CPT | Performed by: NURSE PRACTITIONER

## 2024-09-04 PROCEDURE — 99214 OFFICE O/P EST MOD 30 MIN: CPT | Performed by: NURSE PRACTITIONER

## 2024-09-04 PROCEDURE — 3078F DIAST BP <80 MM HG: CPT | Performed by: NURSE PRACTITIONER

## 2024-09-04 RX ORDER — ATORVASTATIN CALCIUM 10 MG/1
TABLET, FILM COATED ORAL
Qty: 100 TABLET | Refills: 3 | Status: SHIPPED | OUTPATIENT
Start: 2024-09-04

## 2024-09-04 RX ORDER — SPIRONOLACTONE 25 MG
TABLET ORAL
COMMUNITY
Start: 2024-06-01

## 2024-09-04 ASSESSMENT — ENCOUNTER SYMPTOMS
LOSS OF CONSCIOUSNESS: 0
NAUSEA: 0
HEADACHES: 0
ORTHOPNEA: 0
MYALGIAS: 0
INSOMNIA: 0
PALPITATIONS: 0
SHORTNESS OF BREATH: 0
FEVER: 0
BRUISES/BLEEDS EASILY: 0
ABDOMINAL PAIN: 0
CHILLS: 0
DIZZINESS: 0
COUGH: 0
PND: 0

## 2024-09-04 NOTE — PROGRESS NOTES
Chief Complaint   Patient presents with    Follow-Up    Premature Ventricular Contractions (PVCs)           HTN (Controlled)    Hyperlipidemia       Subjective     Avery Monroe is a 79 y.o. male who presents today for nine month follow-up for PVCs, HTN, hyperlipidemia/elevated CTCS score.    Avery is a 79 year old male with history of hypertension, hyperlipidemia/elevated CTCS score and PVCs, last seen by me in December 2023.    He is here today for nine month follow-up. BP Is well controlled, and he stays active. He plans to swim [email or web address] next Summer 2025, and wants stress testing done in June 2025.    He is not having any symptoms: no chest pain, pressure, tightness or discomfort; no symptomatic palpitations, as he takes magnesium, and no skipped beats; no shortness of breath, orthopnea or PND; no dizziness or syncope; no LE edema.     Past Medical History:   Diagnosis Date    Carotid stenosis     Hyperlipidemia     Hypertension     Mitral regurgitation     Prostate cancer (HCC)     PVC (premature ventricular contraction)     Rheumatic fever      History reviewed. No pertinent surgical history.  Family History   Problem Relation Age of Onset    Heart Disease Mother         Valve replacement, late 80s    Stroke Mother     Hypertension Mother     Heart Attack Father     Kidney Disease Father      Social History     Socioeconomic History    Marital status:      Spouse name: Not on file    Number of children: Not on file    Years of education: Not on file    Highest education level: Not on file   Occupational History    Not on file   Tobacco Use    Smoking status: Never    Smokeless tobacco: Never   Substance and Sexual Activity    Alcohol use: No    Drug use: Yes     Frequency: 7.0 times per week     Types: Marijuana    Sexual activity: Yes   Other Topics Concern    Not on file   Social History Narrative    Not on file     Social Determinants of Health     Financial Resource Strain: Not on file  "  Food Insecurity: Not on file   Transportation Needs: Not on file   Physical Activity: Not on file   Stress: Not on file   Social Connections: Not on file   Intimate Partner Violence: Not on file   Housing Stability: Not on file     Allergies   Allergen Reactions    Statins [Hmg-Coa-R Inhibitors]     Sulfa Drugs      Outpatient Encounter Medications as of 9/4/2024   Medication Sig Dispense Refill    Lutein 20 MG Cap       atorvastatin (LIPITOR) 10 MG Tab TAKE 1 TABLET BY MOUTH ONCE DAILY IN THE EVENING 100 Tablet 3    FLOMAX 0.4 MG capsule Take 0.4 mg by mouth every day.      vitamin D3 (CHOLECALCIFEROL) 1000 Unit (25 mcg) Tab Take 1,000 Units by mouth every day.      Menaquinone-7 (VITAMIN K2 PO) Take  by mouth.      IODINE, KELP, PO Take 200 mg by mouth every day.      magnesium gluconate (MAG-G) 500 MG tablet Take 500 mg by mouth every day.      Calcium Carbonate-Vitamin D (CALCIUM PLUS VITAMIN D PO) Take  by mouth.      B Complex Vitamins (B COMPLEX 1 PO) Take  by mouth every day.      [DISCONTINUED] atorvastatin (LIPITOR) 10 MG Tab TAKE 1 TABLET BY MOUTH ONCE DAILY IN THE EVENING 100 Tablet 3     No facility-administered encounter medications on file as of 9/4/2024.     Review of Systems   Constitutional:  Negative for chills and fever.   HENT:  Negative for congestion.    Respiratory:  Negative for cough and shortness of breath.    Cardiovascular:  Negative for chest pain, palpitations, orthopnea, leg swelling and PND.   Gastrointestinal:  Negative for abdominal pain and nausea.   Musculoskeletal:  Negative for myalgias.   Skin:  Negative for rash.   Neurological:  Negative for dizziness, loss of consciousness and headaches.   Endo/Heme/Allergies:  Does not bruise/bleed easily.   Psychiatric/Behavioral:  The patient does not have insomnia.               Objective     /72 (BP Location: Left arm, Patient Position: Sitting, BP Cuff Size: Adult)   Pulse 74   Resp 16   Ht 1.778 m (5' 10\")   Wt 74 kg (163 " lb 2.3 oz)   SpO2 94%   BMI 23.41 kg/m²     Physical Exam  Constitutional:       Appearance: He is well-developed.   HENT:      Head: Normocephalic.   Neck:      Vascular: No JVD.   Cardiovascular:      Rate and Rhythm: Normal rate and regular rhythm.      Heart sounds: Normal heart sounds.   Pulmonary:      Effort: Pulmonary effort is normal. No respiratory distress.      Breath sounds: Normal breath sounds. No wheezing or rales.   Abdominal:      General: Bowel sounds are normal. There is no distension.      Palpations: Abdomen is soft.      Tenderness: There is no abdominal tenderness.   Musculoskeletal:         General: Normal range of motion.      Cervical back: Normal range of motion and neck supple.   Skin:     General: Skin is warm and dry.      Findings: No rash.   Neurological:      Mental Status: He is alert and oriented to person, place, and time.   Psychiatric:         Mood and Affect: Mood normal.         Behavior: Behavior normal.       FINDINGS OF CTCS OF 5/7/2018:  Coronary calcification:  LMA - 0.0  LCX - 33.2  LAD - 164.5  RCA - 16.6  Calcium score:  214.4     CONCLUSIONS OF STRESS ECHO OF 5/7/2014:  Negative stress echo for ischemia.   Fair exercise tolerance, achieving 7.0 METS and 99 % of max predicted heart rate.   Cardiac chamber sizes and left ventricular systolic function are normal at rest   Appropriate augmentation of left ventricular function after maximal exercise with decrease in end-systolic dimensions         Lab Results   Component Value Date/Time    CHOLSTRLTOT 149 02/21/2024 12:00 AM    LDL 69 02/21/2024 12:00 AM    HDL 63 02/21/2024 12:00 AM    TRIGLYCERIDE 89 02/21/2024 12:00 AM        LABS AS OF 2/21/2024:  Glucose 100  BUN 21  Creatinine 1.25  GFR 59  Potassium 4.3  AST 21  ALT 19      Assessment & Plan     1. PVC (premature ventricular contraction)        2. Essential hypertension        3. Mixed hyperlipidemia  atorvastatin (LIPITOR) 10 MG Tab      4. Agatston CAC score  200-399            Medical Decision Making: Today's Assessment/Status/Plan:      1. History of PVCs, stable on magnesium. No symptomatic palpitations.    2. Hypertension, not currently on any therapy. BP remains stable.    3. Hyperlipidemia/elevated CTCS score, on Lipitor 10mg. Last LDL was 69.    He is doing well and remains stable.  Same medications for now. We reviewed lab results from February 2024.  He does plan to swim [email or web address] next summer 2025.  Consider echocardiogram and ETT, or possible stress echo then.    Follow-up in early June 2025; follow-up sooner if clinical condition changes.

## 2025-06-11 ENCOUNTER — APPOINTMENT (OUTPATIENT)
Dept: CARDIOLOGY | Facility: PHYSICIAN GROUP | Age: 80
End: 2025-06-11
Payer: MEDICARE

## 2025-06-11 VITALS
BODY MASS INDEX: 23.7 KG/M2 | DIASTOLIC BLOOD PRESSURE: 68 MMHG | SYSTOLIC BLOOD PRESSURE: 102 MMHG | OXYGEN SATURATION: 96 % | HEIGHT: 70 IN | WEIGHT: 165.57 LBS | RESPIRATION RATE: 16 BRPM | HEART RATE: 71 BPM

## 2025-06-11 DIAGNOSIS — I65.29 STENOSIS OF CAROTID ARTERY, UNSPECIFIED LATERALITY: ICD-10-CM

## 2025-06-11 DIAGNOSIS — I10 ESSENTIAL HYPERTENSION: ICD-10-CM

## 2025-06-11 DIAGNOSIS — R93.1 AGATSTON CAC SCORE 200-399: ICD-10-CM

## 2025-06-11 DIAGNOSIS — I49.3 PVC (PREMATURE VENTRICULAR CONTRACTION): ICD-10-CM

## 2025-06-11 DIAGNOSIS — E78.2 MIXED HYPERLIPIDEMIA: ICD-10-CM

## 2025-06-11 PROCEDURE — 3078F DIAST BP <80 MM HG: CPT | Performed by: NURSE PRACTITIONER

## 2025-06-11 PROCEDURE — 3074F SYST BP LT 130 MM HG: CPT | Performed by: NURSE PRACTITIONER

## 2025-06-11 PROCEDURE — 99214 OFFICE O/P EST MOD 30 MIN: CPT | Performed by: NURSE PRACTITIONER

## 2025-06-11 RX ORDER — ATORVASTATIN CALCIUM 10 MG/1
TABLET, FILM COATED ORAL
Qty: 100 TABLET | Refills: 3 | Status: SHIPPED | OUTPATIENT
Start: 2025-06-11

## 2025-06-11 ASSESSMENT — ENCOUNTER SYMPTOMS
HEADACHES: 0
ABDOMINAL PAIN: 0
MYALGIAS: 0
FEVER: 0
LOSS OF CONSCIOUSNESS: 0
SHORTNESS OF BREATH: 0
INSOMNIA: 0
DIZZINESS: 0
COUGH: 0
ORTHOPNEA: 0
PND: 0
NAUSEA: 0
PALPITATIONS: 0
BRUISES/BLEEDS EASILY: 0
CHILLS: 0

## 2025-06-11 NOTE — PROGRESS NOTES
Chief Complaint   Patient presents with    Follow-Up    Premature Ventricular Contractions (PVCs)    HTN (Controlled)    Hyperlipidemia       Subjective     Avery Monroe is a 80 y.o. male who presents today for nine month follow-up of PVCs, HTN and hyperlipidemia.    Avery is a 80 year old male with history of hypertension, hyperlipidemia/elevated CTCS score and PVCs, last seen by me in September 2024.     He is here today for nine month follow-up. BP Is well controlled, and he stays active. He exercises regularly (swims, walks) and tolerates well without any symptoms or anginal equivalents.    He plans to swim a 5K in [place] in the next couple of weeks; he has been training without any issues or symptoms.     He denies any chest pain, pressure, tightness or discomfort; no symptomatic palpitations, as he takes magnesium, and no skipped beats; no shortness of breath, orthopnea or PND; no dizziness or syncope; no LE edema. BP remains well controlled; his diastolic BP occasionally goes into the 80s, but comes down later in the day.    Past Medical History[1]  Past Surgical History[2]  Family History   Problem Relation Age of Onset    Heart Disease Mother         Valve replacement, late 80s    Stroke Mother     Hypertension Mother     Heart Attack Father     Kidney Disease Father      Social History     Socioeconomic History    Marital status:      Spouse name: Not on file    Number of children: Not on file    Years of education: Not on file    Highest education level: Not on file   Occupational History    Not on file   Tobacco Use    Smoking status: Never    Smokeless tobacco: Never   Substance and Sexual Activity    Alcohol use: No    Drug use: Yes     Frequency: 7.0 times per week     Types: Marijuana    Sexual activity: Yes   Other Topics Concern    Not on file   Social History Narrative    Not on file     Social Drivers of Health     Financial Resource Strain: Not on file   Food Insecurity: Not  "on file   Transportation Needs: Not on file   Physical Activity: Not on file   Stress: Not on file   Social Connections: Not on file   Intimate Partner Violence: Not on file   Housing Stability: Not on file     Allergies[3]  Encounter Medications[4]  Review of Systems   Constitutional:  Negative for chills and fever.   HENT:  Negative for congestion.    Respiratory:  Negative for cough and shortness of breath.    Cardiovascular:  Negative for chest pain, palpitations, orthopnea, leg swelling and PND.   Gastrointestinal:  Negative for abdominal pain and nausea.   Musculoskeletal:  Negative for myalgias.   Skin:  Negative for rash.   Neurological:  Negative for dizziness, loss of consciousness and headaches.   Endo/Heme/Allergies:  Does not bruise/bleed easily.   Psychiatric/Behavioral:  The patient does not have insomnia.               Objective     /68 (BP Location: Left arm, Patient Position: Sitting, BP Cuff Size: Adult)   Pulse 71   Resp 16   Ht 1.778 m (5' 10\")   Wt 75.1 kg (165 lb 9.1 oz)   SpO2 96%   BMI 23.76 kg/m²     Physical Exam  Constitutional:       Appearance: He is well-developed.   HENT:      Head: Normocephalic.   Neck:      Vascular: No JVD.   Cardiovascular:      Rate and Rhythm: Normal rate and regular rhythm.      Heart sounds: Normal heart sounds.   Pulmonary:      Effort: Pulmonary effort is normal. No respiratory distress.      Breath sounds: Normal breath sounds. No wheezing or rales.   Abdominal:      General: Bowel sounds are normal. There is no distension.      Palpations: Abdomen is soft.      Tenderness: There is no abdominal tenderness.   Musculoskeletal:         General: Normal range of motion.      Cervical back: Normal range of motion and neck supple.   Skin:     General: Skin is warm and dry.      Findings: No rash.   Neurological:      Mental Status: He is alert and oriented to person, place, and time.   Psychiatric:         Mood and Affect: Mood normal.         " Behavior: Behavior normal.       CT SCAN(S):    FINDINGS OF CTCS OF 5/7/2018:  Coronary calcification:  LMA - 0.0  LCX - 33.2  LAD - 164.5  RCA - 16.6  Calcium score:  214.4     STRESS TESTING:    CONCLUSIONS OF STRESS ECHO OF 5/7/2014:  Negative stress echo for ischemia.   Fair exercise tolerance, achieving 7.0 METS and 99 % of max predicted heart rate.   Cardiac chamber sizes and left ventricular systolic function are normal at rest   Appropriate augmentation of left ventricular function after maximal exercise with decrease in end-systolic dimensions       LABS:    Lab Results   Component Value Date/Time    CHOLSTRLTOT 149 02/21/2024 12:00 AM    LDL 69 02/21/2024 12:00 AM    HDL 63 02/21/2024 12:00 AM    TRIGLYCERIDE 89 02/21/2024 12:00 AM        Lab Results   Component Value Date/Time    ASTSGOT 22 05/07/2019 07:56 AM    ASTSGOT 14 05/03/2018 07:35 AM    ALTSGPT 17 05/07/2019 07:56 AM    ALTSGPT 12 05/03/2018 07:35 AM       Lab Results   Component Value Date/Time    SODIUM 142 05/07/2019 07:56 AM    SODIUM 138 05/03/2018 07:35 AM    POTASSIUM 4.3 05/07/2019 07:56 AM    POTASSIUM 4.2 05/03/2018 07:35 AM    CHLORIDE 104 05/07/2019 07:56 AM    CHLORIDE 104 05/03/2018 07:35 AM    CO2 22 05/07/2019 07:56 AM    CO2 25 05/03/2018 07:35 AM    GLUCOSE 98 05/07/2019 07:56 AM    GLUCOSE 78 05/03/2018 07:35 AM    BUN 15 05/07/2019 07:56 AM    BUN 17 05/03/2018 07:35 AM    CREATININE 0.92 05/07/2019 07:56 AM    CREATININE 0.93 05/03/2018 07:35 AM    CREATININE 0.85 04/05/2013 06:40 AM    CREATININE 0.85 04/05/2013 06:40 AM    BUNCREATRAT 16 05/07/2019 07:56 AM    BUNCREATRAT 28 (H) 04/05/2013 06:40 AM    BUNCREATRAT 28 (H) 04/05/2013 06:40 AM          Assessment & Plan     1. PVC (premature ventricular contraction)        2. Essential hypertension        3. Mixed hyperlipidemia  Comp Metabolic Panel    Lipid Profile    atorvastatin (LIPITOR) 10 MG Tab      4. Agatston CAC score 200-399        5. Stenosis of carotid artery,  unspecified laterality            Medical Decision Making: Today's Assessment/Status/Plan:        PVCs, stable on magnesium 500mg once daily. None heard/felt today.  Hypertension, not currently on any therapy. To continue to monitor BP at home. If diastolic BP stays above 80, consider pharmacologic therapy.  Hyperlipidemia/elevated CAC score on Lipitor 20mg. To repeat fasting CMP and lipid panel.  Minimal carotid stenosis, remains on Lipitor.     He is doing well and remains stable/asymptomatic.  Same medications for now.  Repeat CMP and lipid panel.    Follow-up annually, sooner if clinical condition changes.                     [1]   Past Medical History:  Diagnosis Date    Carotid stenosis     Hyperlipidemia     Hypertension     Mitral regurgitation     Prostate cancer (HCC)     PVC (premature ventricular contraction)     Rheumatic fever    [2] History reviewed. No pertinent surgical history.  [3]   Allergies  Allergen Reactions    Statins [Hmg-Coa-R Inhibitors]     Sulfa Drugs    [4]   Outpatient Encounter Medications as of 6/11/2025   Medication Sig Dispense Refill    atorvastatin (LIPITOR) 10 MG Tab TAKE 1 TABLET BY MOUTH ONCE DAILY IN THE EVENING 100 Tablet 3    Lutein 20 MG Cap       FLOMAX 0.4 MG capsule Take 0.4 mg by mouth every day.      vitamin D3 (CHOLECALCIFEROL) 1000 Unit (25 mcg) Tab Take 1,000 Units by mouth every day.      Menaquinone-7 (VITAMIN K2 PO) Take  by mouth.      IODINE, KELP, PO Take 200 mg by mouth every day.      magnesium gluconate (MAG-G) 500 MG tablet Take 500 mg by mouth every day.      Calcium Carbonate-Vitamin D (CALCIUM PLUS VITAMIN D PO) Take  by mouth.      B Complex Vitamins (B COMPLEX 1 PO) Take  by mouth every day.      [DISCONTINUED] atorvastatin (LIPITOR) 10 MG Tab TAKE 1 TABLET BY MOUTH ONCE DAILY IN THE EVENING 100 Tablet 3     No facility-administered encounter medications on file as of 6/11/2025.